# Patient Record
Sex: MALE | Employment: FULL TIME | URBAN - METROPOLITAN AREA
[De-identification: names, ages, dates, MRNs, and addresses within clinical notes are randomized per-mention and may not be internally consistent; named-entity substitution may affect disease eponyms.]

---

## 2023-02-21 PROBLEM — M25.511 ACUTE PAIN OF RIGHT SHOULDER: Status: ACTIVE | Noted: 2023-02-21

## 2023-02-21 PROBLEM — F43.0 STRESS REACTION: Status: ACTIVE | Noted: 2023-02-21

## 2023-02-21 PROBLEM — J45.909 REACTIVE AIRWAY DISEASE (HHS-HCC): Status: ACTIVE | Noted: 2023-02-21

## 2023-02-21 PROBLEM — R10.31: Status: ACTIVE | Noted: 2023-02-21

## 2023-02-21 PROBLEM — K58.9 IBS (IRRITABLE BOWEL SYNDROME): Status: ACTIVE | Noted: 2023-02-21

## 2023-02-21 PROBLEM — R73.01 ELEVATED FASTING GLUCOSE: Status: ACTIVE | Noted: 2023-02-21

## 2023-02-21 PROBLEM — R10.32: Status: ACTIVE | Noted: 2023-02-21

## 2023-02-21 PROBLEM — R30.0 DYSURIA: Status: ACTIVE | Noted: 2023-02-21

## 2023-02-21 PROBLEM — K21.9 GERD (GASTROESOPHAGEAL REFLUX DISEASE): Status: ACTIVE | Noted: 2023-02-21

## 2023-02-21 PROBLEM — R74.01 ELEVATED ALT MEASUREMENT: Status: ACTIVE | Noted: 2023-02-21

## 2023-02-21 PROBLEM — E78.00 ELEVATED LDL CHOLESTEROL LEVEL: Status: ACTIVE | Noted: 2023-02-21

## 2023-02-21 PROBLEM — F19.939: Status: ACTIVE | Noted: 2023-02-21

## 2023-02-21 PROBLEM — K76.0 FATTY LIVER: Status: ACTIVE | Noted: 2023-02-21

## 2023-02-21 PROBLEM — R32 URINARY LEAKAGE: Status: ACTIVE | Noted: 2023-02-21

## 2023-02-21 RX ORDER — SILODOSIN 4 MG/1
4 CAPSULE ORAL DAILY
COMMUNITY
End: 2024-01-09 | Stop reason: WASHOUT

## 2023-02-21 RX ORDER — SERTRALINE HYDROCHLORIDE 50 MG/1
1 TABLET, FILM COATED ORAL
COMMUNITY
End: 2023-06-16

## 2023-02-21 RX ORDER — CHLORDIAZEPOXIDE HYDROCHLORIDE AND CLIDINIUM BROMIDE 5; 2.5 MG/1; MG/1
1 CAPSULE ORAL DAILY
COMMUNITY

## 2023-02-21 RX ORDER — ALBUTEROL SULFATE 90 UG/1
2 AEROSOL, METERED RESPIRATORY (INHALATION) EVERY 4 HOURS PRN
COMMUNITY
End: 2024-01-09 | Stop reason: WASHOUT

## 2023-03-29 ENCOUNTER — APPOINTMENT (OUTPATIENT)
Dept: PRIMARY CARE | Facility: CLINIC | Age: 43
End: 2023-03-29
Payer: COMMERCIAL

## 2023-05-31 ENCOUNTER — OFFICE VISIT (OUTPATIENT)
Dept: PRIMARY CARE | Facility: CLINIC | Age: 43
End: 2023-05-31
Payer: COMMERCIAL

## 2023-05-31 VITALS
HEIGHT: 69 IN | WEIGHT: 247 LBS | DIASTOLIC BLOOD PRESSURE: 70 MMHG | SYSTOLIC BLOOD PRESSURE: 140 MMHG | BODY MASS INDEX: 36.58 KG/M2 | HEART RATE: 74 BPM | OXYGEN SATURATION: 96 %

## 2023-05-31 DIAGNOSIS — K21.9 GASTROESOPHAGEAL REFLUX DISEASE, UNSPECIFIED WHETHER ESOPHAGITIS PRESENT: ICD-10-CM

## 2023-05-31 DIAGNOSIS — K76.0 FATTY LIVER: ICD-10-CM

## 2023-05-31 DIAGNOSIS — R20.0 NUMBNESS AND TINGLING IN RIGHT HAND: Primary | ICD-10-CM

## 2023-05-31 DIAGNOSIS — R20.2 NUMBNESS AND TINGLING IN RIGHT HAND: Primary | ICD-10-CM

## 2023-05-31 DIAGNOSIS — M79.644 PAIN OF RIGHT THUMB: ICD-10-CM

## 2023-05-31 DIAGNOSIS — K58.0 IRRITABLE BOWEL SYNDROME WITH DIARRHEA: ICD-10-CM

## 2023-05-31 DIAGNOSIS — E78.00 ELEVATED LDL CHOLESTEROL LEVEL: ICD-10-CM

## 2023-05-31 DIAGNOSIS — E66.01 CLASS 2 SEVERE OBESITY DUE TO EXCESS CALORIES WITH SERIOUS COMORBIDITY AND BODY MASS INDEX (BMI) OF 36.0 TO 36.9 IN ADULT (MULTI): ICD-10-CM

## 2023-05-31 DIAGNOSIS — E11.9 TYPE 2 DIABETES MELLITUS WITHOUT COMPLICATION, WITHOUT LONG-TERM CURRENT USE OF INSULIN (MULTI): ICD-10-CM

## 2023-05-31 PROBLEM — E66.812 CLASS 2 SEVERE OBESITY DUE TO EXCESS CALORIES WITH SERIOUS COMORBIDITY AND BODY MASS INDEX (BMI) OF 36.0 TO 36.9 IN ADULT: Status: ACTIVE | Noted: 2023-05-31

## 2023-05-31 PROCEDURE — 3077F SYST BP >= 140 MM HG: CPT | Performed by: INTERNAL MEDICINE

## 2023-05-31 PROCEDURE — 1036F TOBACCO NON-USER: CPT | Performed by: INTERNAL MEDICINE

## 2023-05-31 PROCEDURE — 3078F DIAST BP <80 MM HG: CPT | Performed by: INTERNAL MEDICINE

## 2023-05-31 PROCEDURE — 99214 OFFICE O/P EST MOD 30 MIN: CPT | Performed by: INTERNAL MEDICINE

## 2023-05-31 PROCEDURE — 3008F BODY MASS INDEX DOCD: CPT | Performed by: INTERNAL MEDICINE

## 2023-05-31 RX ORDER — OMEPRAZOLE 20 MG/1
20 CAPSULE, DELAYED RELEASE ORAL DAILY
COMMUNITY

## 2023-05-31 RX ORDER — FLUTICASONE PROPIONATE 50 MCG
1 SPRAY, SUSPENSION (ML) NASAL DAILY
COMMUNITY
End: 2024-01-09 | Stop reason: WASHOUT

## 2023-05-31 ASSESSMENT — ENCOUNTER SYMPTOMS
NUMBNESS: 1
SHORTNESS OF BREATH: 0
ARTHRALGIAS: 1
FATIGUE: 0
WHEEZING: 0

## 2023-05-31 NOTE — PROGRESS NOTES
"Subjective   Patient ID: Erica Dubose is a 42 y.o. male who presents for Follow-up chronic medical problems.    SOB better with flonase spray.  2 months of right hand pain.  Numbness and electric feeling right 1-3 fingers.  Also right thumb pain.  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  Occasional joint pain, right elbow, fingers.  BS checks at 110-120.  Not watching diet.  Active outside, no formal HEP.  Weight up 20 pounds.  Meds and labs reviewed.         Review of Systems   Constitutional:  Negative for fatigue.   Respiratory:  Negative for shortness of breath and wheezing.    Cardiovascular:  Negative for chest pain.   Musculoskeletal:  Positive for arthralgias.        Hand pain   Neurological:  Positive for numbness.       Objective   /70 (BP Location: Right arm, Patient Position: Sitting)   Pulse 74   Ht 1.753 m (5' 9\")   Wt 112 kg (247 lb)   SpO2 96%   BMI 36.48 kg/m²     Physical Exam  Constitutional:       Appearance: Normal appearance.   Cardiovascular:      Rate and Rhythm: Normal rate and regular rhythm.      Pulses: Normal pulses.      Heart sounds: Normal heart sounds.   Pulmonary:      Effort: Pulmonary effort is normal.      Breath sounds: Normal breath sounds.   Abdominal:      General: Abdomen is flat.      Palpations: Abdomen is soft.   Neurological:      General: No focal deficit present.      Mental Status: He is alert.   Psychiatric:         Mood and Affect: Mood normal.         Behavior: Behavior normal.         Thought Content: Thought content normal.         Judgment: Judgment normal.         Assessment/Plan   Problem List Items Addressed This Visit          Nervous    Numbness and tingling in right hand - Primary     Schedule NCS/EMG, rec ortho consult.         Relevant Orders    XR hand right 1-2 views    EMG & nerve conduction    Referral to Orthopaedic Surgery       Digestive    Fatty liver     Check liver panel given weight gain.         GERD (gastroesophageal reflux " disease)     GERD improved with H2 blocker or PPI.          IBS (irritable bowel syndrome)     Better with fiber, librax as needed.            Musculoskeletal    Pain of right thumb     Check xrays, rec ortho consult.         Relevant Orders    XR hand right 1-2 views    Referral to Orthopaedic Surgery       Endocrine/Metabolic    Type 2 diabetes mellitus without complication, without long-term current use of insulin (CMS/[facility])     Last A1c 5.8 with diet.         Relevant Orders    Comprehensive metabolic panel    Hemoglobin A1c    Class 2 severe obesity due to excess calories with serious comorbidity and body mass index (BMI) of 36.0 to 36.9 in adult (CMS/[facility])     Recommend diet, exercise and weight management.             Other    Elevated LDL cholesterol level     Check lipid panel given weight gain.         Relevant Orders    Lipid panel

## 2023-06-02 ENCOUNTER — LAB (OUTPATIENT)
Dept: LAB | Facility: LAB | Age: 43
End: 2023-06-02
Payer: COMMERCIAL

## 2023-06-02 DIAGNOSIS — E78.00 ELEVATED LDL CHOLESTEROL LEVEL: ICD-10-CM

## 2023-06-02 DIAGNOSIS — E11.9 TYPE 2 DIABETES MELLITUS WITHOUT COMPLICATION, WITHOUT LONG-TERM CURRENT USE OF INSULIN (MULTI): ICD-10-CM

## 2023-06-02 LAB
ALANINE AMINOTRANSFERASE (SGPT) (U/L) IN SER/PLAS: 32 U/L (ref 10–52)
ALBUMIN (G/DL) IN SER/PLAS: 4.7 G/DL (ref 3.4–5)
ALKALINE PHOSPHATASE (U/L) IN SER/PLAS: 56 U/L (ref 33–120)
ANION GAP IN SER/PLAS: 15 MMOL/L (ref 10–20)
ASPARTATE AMINOTRANSFERASE (SGOT) (U/L) IN SER/PLAS: 22 U/L (ref 9–39)
BILIRUBIN TOTAL (MG/DL) IN SER/PLAS: 1.2 MG/DL (ref 0–1.2)
CALCIUM (MG/DL) IN SER/PLAS: 9.8 MG/DL (ref 8.6–10.6)
CARBON DIOXIDE, TOTAL (MMOL/L) IN SER/PLAS: 27 MMOL/L (ref 21–32)
CHLORIDE (MMOL/L) IN SER/PLAS: 104 MMOL/L (ref 98–107)
CHOLESTEROL (MG/DL) IN SER/PLAS: 194 MG/DL (ref 0–199)
CHOLESTEROL IN HDL (MG/DL) IN SER/PLAS: 42 MG/DL
CHOLESTEROL/HDL RATIO: 4.6
CREATININE (MG/DL) IN SER/PLAS: 0.92 MG/DL (ref 0.5–1.3)
ESTIMATED AVERAGE GLUCOSE FOR HBA1C: 131 MG/DL
GFR MALE: >90 ML/MIN/1.73M2
GLUCOSE (MG/DL) IN SER/PLAS: 114 MG/DL (ref 74–99)
HEMOGLOBIN A1C/HEMOGLOBIN TOTAL IN BLOOD: 6.2 %
LDL: 126 MG/DL (ref 0–99)
POTASSIUM (MMOL/L) IN SER/PLAS: 4.5 MMOL/L (ref 3.5–5.3)
PROTEIN TOTAL: 7.3 G/DL (ref 6.4–8.2)
SODIUM (MMOL/L) IN SER/PLAS: 141 MMOL/L (ref 136–145)
TRIGLYCERIDE (MG/DL) IN SER/PLAS: 132 MG/DL (ref 0–149)
UREA NITROGEN (MG/DL) IN SER/PLAS: 16 MG/DL (ref 6–23)
VLDL: 26 MG/DL (ref 0–40)

## 2023-06-02 PROCEDURE — 83036 HEMOGLOBIN GLYCOSYLATED A1C: CPT

## 2023-06-02 PROCEDURE — 36415 COLL VENOUS BLD VENIPUNCTURE: CPT

## 2023-06-02 PROCEDURE — 80061 LIPID PANEL: CPT

## 2023-06-02 PROCEDURE — 80053 COMPREHEN METABOLIC PANEL: CPT

## 2023-06-16 DIAGNOSIS — F43.0 ACUTE STRESS REACTION: ICD-10-CM

## 2023-06-16 RX ORDER — SERTRALINE HYDROCHLORIDE 50 MG/1
TABLET, FILM COATED ORAL
Qty: 90 TABLET | Refills: 1 | Status: SHIPPED | OUTPATIENT
Start: 2023-06-16 | End: 2023-12-13

## 2023-06-20 ENCOUNTER — APPOINTMENT (OUTPATIENT)
Dept: PRIMARY CARE | Facility: CLINIC | Age: 43
End: 2023-06-20
Payer: COMMERCIAL

## 2023-07-17 ENCOUNTER — OFFICE VISIT (OUTPATIENT)
Dept: PRIMARY CARE | Facility: CLINIC | Age: 43
End: 2023-07-17
Payer: COMMERCIAL

## 2023-07-17 VITALS
WEIGHT: 240 LBS | OXYGEN SATURATION: 96 % | HEART RATE: 71 BPM | BODY MASS INDEX: 35.55 KG/M2 | SYSTOLIC BLOOD PRESSURE: 130 MMHG | HEIGHT: 69 IN | DIASTOLIC BLOOD PRESSURE: 74 MMHG

## 2023-07-17 DIAGNOSIS — E66.01 CLASS 2 SEVERE OBESITY DUE TO EXCESS CALORIES WITH SERIOUS COMORBIDITY AND BODY MASS INDEX (BMI) OF 35.0 TO 35.9 IN ADULT (MULTI): ICD-10-CM

## 2023-07-17 DIAGNOSIS — F15.93: ICD-10-CM

## 2023-07-17 DIAGNOSIS — E11.9 TYPE 2 DIABETES MELLITUS WITHOUT COMPLICATION, WITHOUT LONG-TERM CURRENT USE OF INSULIN (MULTI): ICD-10-CM

## 2023-07-17 DIAGNOSIS — E78.00 ELEVATED LDL CHOLESTEROL LEVEL: ICD-10-CM

## 2023-07-17 DIAGNOSIS — M25.521 RIGHT ELBOW PAIN: ICD-10-CM

## 2023-07-17 DIAGNOSIS — Z00.00 ENCOUNTER FOR PREVENTATIVE ADULT HEALTH CARE EXAMINATION: Primary | ICD-10-CM

## 2023-07-17 PROBLEM — M25.511 ACUTE PAIN OF RIGHT SHOULDER: Status: RESOLVED | Noted: 2023-02-21 | Resolved: 2023-07-17

## 2023-07-17 PROCEDURE — 99396 PREV VISIT EST AGE 40-64: CPT | Performed by: INTERNAL MEDICINE

## 2023-07-17 PROCEDURE — 3075F SYST BP GE 130 - 139MM HG: CPT | Performed by: INTERNAL MEDICINE

## 2023-07-17 PROCEDURE — 3044F HG A1C LEVEL LT 7.0%: CPT | Performed by: INTERNAL MEDICINE

## 2023-07-17 PROCEDURE — 3078F DIAST BP <80 MM HG: CPT | Performed by: INTERNAL MEDICINE

## 2023-07-17 PROCEDURE — 3008F BODY MASS INDEX DOCD: CPT | Performed by: INTERNAL MEDICINE

## 2023-07-17 PROCEDURE — 1036F TOBACCO NON-USER: CPT | Performed by: INTERNAL MEDICINE

## 2023-07-17 ASSESSMENT — ENCOUNTER SYMPTOMS
CONSTITUTIONAL NEGATIVE: 1
HEMATOLOGIC/LYMPHATIC NEGATIVE: 1
ALLERGIC/IMMUNOLOGIC NEGATIVE: 1
ENDOCRINE NEGATIVE: 1
CARDIOVASCULAR NEGATIVE: 1
RESPIRATORY NEGATIVE: 1
GASTROINTESTINAL NEGATIVE: 1
NEUROLOGICAL NEGATIVE: 1
PSYCHIATRIC NEGATIVE: 1
EYES NEGATIVE: 1

## 2023-07-17 NOTE — PROGRESS NOTES
"Subjective   Patient ID: Erica Dubose is a 43 y.o. male who presents for Annual Exam and Follow-up (Right elbow pain now, unable to lift.  Concerned on ligament issues. Recently DX CTS right/).    Had fu with ortho.  Cortisone injection right wrist.  Mild carpal tunnel right wrist, EMG findings.  Always has sensation of pain in right wrist.  Pain in thumb, works with mouse 8 hours daily.  Right elbow pain, cannot lift 4 pounds.  Accidentally hit father's head with right elbow.  Injury occurred months ago.    Diet, trying to follow healthy, rice and vegetables.  HEP, 8000 steps daily.  Weight down about 7 pounds past year.  Meds and labs reviewed.     Review of Systems   Constitutional: Negative.    HENT: Negative.     Eyes: Negative.    Respiratory: Negative.     Cardiovascular: Negative.    Gastrointestinal: Negative.    Endocrine: Negative.    Genitourinary: Negative.    Musculoskeletal:         Right elbow and hand pain.   Skin: Negative.    Allergic/Immunologic: Negative.    Neurological: Negative.    Hematological: Negative.    Psychiatric/Behavioral: Negative.         Objective   /74 (BP Location: Right arm, Patient Position: Sitting)   Pulse 71   Ht 1.753 m (5' 9\")   Wt 109 kg (240 lb)   SpO2 96%   BMI 35.44 kg/m²     Physical Exam  Vitals and nursing note reviewed.   Constitutional:       Appearance: Normal appearance.   HENT:      Head: Normocephalic and atraumatic.      Right Ear: Tympanic membrane normal.      Left Ear: Tympanic membrane normal.      Nose: Nose normal.      Mouth/Throat:      Pharynx: Oropharynx is clear.   Eyes:      Extraocular Movements: Extraocular movements intact.      Conjunctiva/sclera: Conjunctivae normal.      Pupils: Pupils are equal, round, and reactive to light.   Cardiovascular:      Rate and Rhythm: Normal rate and regular rhythm.      Pulses: Normal pulses.      Heart sounds: Normal heart sounds.   Pulmonary:      Effort: Pulmonary effort is normal.      " Breath sounds: Normal breath sounds.   Abdominal:      General: Bowel sounds are normal.      Palpations: Abdomen is soft.   Musculoskeletal:         General: Normal range of motion.      Cervical back: Normal range of motion and neck supple.   Skin:     General: Skin is warm and dry.   Neurological:      General: No focal deficit present.      Mental Status: He is alert and oriented to person, place, and time. Mental status is at baseline.   Psychiatric:         Mood and Affect: Mood normal.         Behavior: Behavior normal.         Thought Content: Thought content normal.         Judgment: Judgment normal.       Assessment/Plan     Adult Health Maintenance  A1c 6.2 with diet.  Recommend diet, exercise and weight management.  Consider meds if A1c not improved.  Rec xrays for right elbow pain.  Has fu with ortho.  Recheck labs with next ov in 6 months.    Problem List Items Addressed This Visit          Cardiac and Vasculature    Elevated LDL cholesterol level    Relevant Orders    Lipid Panel       Endocrine/Metabolic    Type 2 diabetes mellitus without complication, without long-term current use of insulin (CMS/[facility])     A1c 6.2 with diet.         Relevant Orders    Hemoglobin A1C    Basic metabolic panel    Class 2 severe obesity due to excess calories with serious comorbidity and body mass index (BMI) of 35.0 to 35.9 in adult (CMS/HCC)     Recommend diet, exercise and weight management.              Health Encounters    Encounter for preventative adult health care examination - Primary       Mental Health    Withdrawal syndrome (CMS/HCC)     Resolved, no longer vaping.            Musculoskeletal and Injuries    Right elbow pain    Relevant Orders    XR elbow right 1-2 views

## 2023-09-29 LAB
ALANINE AMINOTRANSFERASE (SGPT) (U/L) IN SER/PLAS: 36 U/L (ref 10–52)
ALBUMIN (G/DL) IN SER/PLAS: 5.1 G/DL (ref 3.4–5)
ALKALINE PHOSPHATASE (U/L) IN SER/PLAS: 65 U/L (ref 33–120)
ASPARTATE AMINOTRANSFERASE (SGOT) (U/L) IN SER/PLAS: 22 U/L (ref 9–39)
BILIRUBIN DIRECT (MG/DL) IN SER/PLAS: 0.1 MG/DL (ref 0–0.3)
BILIRUBIN TOTAL (MG/DL) IN SER/PLAS: 0.9 MG/DL (ref 0–1.2)
PROTEIN TOTAL: 7.6 G/DL (ref 6.4–8.2)
TISSUE TRANSGLUTAMINASE IGG: <1 U/ML (ref 0–14)
TISSUE TRANSGLUTAMINASE, IGA: <1 U/ML (ref 0–14)

## 2023-09-30 LAB
ALLERGEN FOOD: CLAM (RUDITAPES SPP.) IGE (KU/L): <0.1 KU/L
ALLERGEN FOOD: EGG WHITE IGE (KU/L): <0.1 KU/L
ALLERGEN FOOD: FISH (COD) GADUS MORHUA) IGE (KU/L): <0.1 KU/L
ALLERGEN FOOD: MAIZE, CORN (ZEA MAYS) IGE (KU/L): 0.13 KU/L
ALLERGEN FOOD: MILK IGE (KU/L): 0.13 KU/L
ALLERGEN FOOD: PEANUT (ARACHIS HYPOGAEA) IGE (KU/L): <0.1 KU/L
ALLERGEN FOOD: SCALLOP (PECTEN SPP.) IGE (KU/L): <0.1 KU/L
ALLERGEN FOOD: SESAME SEED (SESAMUM INDICUM) IGE (KU/L): 0.1 KU/L
ALLERGEN FOOD: SHRIMP (P. BOREALIS/MONODON, M. BARBATA/JOYNERI) IGE (KU/L): <0.1 KU/L
ALLERGEN FOOD: SOYBEAN (GLYCINE MAX) IGE (KU/L): <0.1 KU/L
ALLERGEN FOOD: WALNUT (JUGLANS SPP.) IGE (KU/L): <0.1 KU/L
ALLERGEN FOOD: WHEAT (TRITICUM AESTIVUM) IGE (KU/L): <0.1 KU/L
IMMUNOCAP INTERPRETATION: NORMAL

## 2023-10-02 ENCOUNTER — TREATMENT (OUTPATIENT)
Dept: PHYSICAL THERAPY | Facility: CLINIC | Age: 43
End: 2023-10-02
Payer: COMMERCIAL

## 2023-10-02 DIAGNOSIS — M25.521 RIGHT ELBOW PAIN: Primary | ICD-10-CM

## 2023-10-02 PROCEDURE — 97140 MANUAL THERAPY 1/> REGIONS: CPT | Mod: CQ,GP

## 2023-10-02 PROCEDURE — 97110 THERAPEUTIC EXERCISES: CPT | Mod: CQ,GP

## 2023-10-02 NOTE — PROGRESS NOTES
Physical Therapy    Physical Therapy Treatment    Patient Name: Erica Dubose  MRN: [id number]  Today's Date: 10/2/2023         Assessment:   Session focused on pain relief this session d/t increased pain overall. TTP along wrist extensors, niecy proximally but voiced relief during and after. Less pain with wrist AROM following.        Plan: Cont to progress strengthening, manual tx as tolerated        Current Problem  1. Right elbow pain            Subjective   Pt reports increased pain in elbow pain overall. Pain with HEP.      Precautions  None      Pain   2/10 R elbow   Pain reaches to 8/10 with therex/HEP    Objective   No measures today       Treatments:   UBE L3 x 5 min light    R wrist flex/ext stretch x 30sec       Manual tx:   STM to R wrist extensors  R lat epicondyle, tricep

## 2023-10-05 ENCOUNTER — LAB (OUTPATIENT)
Dept: LAB | Facility: LAB | Age: 43
End: 2023-10-05
Payer: COMMERCIAL

## 2023-10-05 DIAGNOSIS — R19.7 DIARRHEA, UNSPECIFIED: Primary | ICD-10-CM

## 2023-10-06 ENCOUNTER — LAB (OUTPATIENT)
Dept: LAB | Facility: LAB | Age: 43
End: 2023-10-06
Payer: COMMERCIAL

## 2023-10-06 DIAGNOSIS — R19.7 DIARRHEA, UNSPECIFIED: ICD-10-CM

## 2023-10-06 PROCEDURE — 82653 EL-1 FECAL QUANTITATIVE: CPT

## 2023-10-06 PROCEDURE — 83993 ASSAY FOR CALPROTECTIN FECAL: CPT

## 2023-10-06 PROCEDURE — 36415 COLL VENOUS BLD VENIPUNCTURE: CPT

## 2023-10-09 ENCOUNTER — TREATMENT (OUTPATIENT)
Dept: PHYSICAL THERAPY | Facility: CLINIC | Age: 43
End: 2023-10-09
Payer: COMMERCIAL

## 2023-10-09 DIAGNOSIS — M25.521 RIGHT ELBOW PAIN: Primary | ICD-10-CM

## 2023-10-09 PROCEDURE — 97140 MANUAL THERAPY 1/> REGIONS: CPT | Mod: GP | Performed by: PHYSICAL THERAPIST

## 2023-10-09 PROCEDURE — 97110 THERAPEUTIC EXERCISES: CPT | Mod: GP | Performed by: PHYSICAL THERAPIST

## 2023-10-09 ASSESSMENT — PAIN SCALES - GENERAL: PAINLEVEL_OUTOF10: 0 - NO PAIN

## 2023-10-09 ASSESSMENT — PAIN - FUNCTIONAL ASSESSMENT: PAIN_FUNCTIONAL_ASSESSMENT: 0-10

## 2023-10-09 NOTE — PROGRESS NOTES
Physical Therapy Treatment    Patient Name: Erica Dubose  MRN: [id number]  Today's Date: 10/9/2023  Time Calculation  Start Time: 1700  Stop Time: 1800  Time Calculation (min): 60 min    Current Problem:  Problem List Items Addressed This Visit             ICD-10-CM       Musculoskeletal and Injuries    Right elbow pain - Primary M25.521       Subjective   General:   Pt was unable to move his elbow after treatment last session.   He reports that manual therapy helped temporarily but did not provide long term relief. The pain started to distribute along the posterior and anterior aspects of the elbow.  At this point his pain has returned back to where it was starting last week's session.     Precautions:  Precautions  Precautions Comment: None    Pain:  Pain Assessment: 0-10  Pain Score: 0 - No pain at rest. Pain 9/10 when performing provoking movements.     Objective   Treatment:  Therapeutic exercise  UBE L3 x5 min light   R wrist flex/ext stretch x30 sec  Tricep pulldowns YTB x20  Lat pulldowns YTB x20  Rows OTB x20  ER pullouts YTB x20    Manual therapy  STM/IASTM and TPR to R wrist extensors, R lat epicondyle, tricep    Modalities  CP to R lateral forearm x10 min    Assessment   Pt responded well to treatment today with challenge.   Tolerated scapular stabilization exercises with minimal pain provocation. This allowed pt to bend elbow into 90 deg flexion without discomfort.   Pt reported much pain relief after manual therapy and trigger point release to R distal triceps insertion.   Skin intact after application of CP to R forearm.     Plan    Continue to progress POC as tolerated by patient to improve strength and mobility.      Goals:

## 2023-10-11 LAB
CALPROTECTIN STL-MCNT: 61 UG/G
ELASTASE PANC STL-MCNT: 568 UG/G

## 2023-10-16 ENCOUNTER — TREATMENT (OUTPATIENT)
Dept: PHYSICAL THERAPY | Facility: CLINIC | Age: 43
End: 2023-10-16
Payer: COMMERCIAL

## 2023-10-16 DIAGNOSIS — M25.521 RIGHT ELBOW PAIN: Primary | ICD-10-CM

## 2023-10-16 DIAGNOSIS — M25.521 PAIN IN RIGHT ELBOW: ICD-10-CM

## 2023-10-16 PROCEDURE — 97140 MANUAL THERAPY 1/> REGIONS: CPT | Mod: GP | Performed by: PHYSICAL THERAPIST

## 2023-10-16 PROCEDURE — 97035 APP MDLTY 1+ULTRASOUND EA 15: CPT | Mod: GP | Performed by: PHYSICAL THERAPIST

## 2023-10-16 ASSESSMENT — PAIN SCALES - GENERAL
PAINLEVEL_OUTOF10: 0 - NO PAIN
PAINLEVEL_OUTOF10: 0 - NO PAIN

## 2023-10-16 NOTE — PROGRESS NOTES
Physical Therapy Treatment    Patient Name: Erica Dubose  MRN: [id number]  Today's Date: 10/16/2023  Time Calculation  Start Time: 1703  Stop Time: 1745  Time Calculation (min): 42 min    Current Problem:  Problem List Items Addressed This Visit             ICD-10-CM    Right elbow pain - Primary M25.521     Other Visit Diagnoses         Codes    Pain in right elbow     M25.521              Subjective   General:   Pt continues to have pain with lifting and other daily activities. 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 Continuous or repeative activity his pain is flared. His pain begins about 15 min into driving. He was unable to lift 5 gallon gas can with R UE due to pain.     Precautions:  Precautions  Precautions Comment: None    Pain:  0/10    Objective   Treatment:  Therapeutic exercise  UBE L3 x5 min light   R wrist flex/ext stretch x30 sec  Tricep pulldowns YTB x20  Lat pulldowns YTB x20  Rows OTB x20  ER pullouts YTB x20    Manual therapy  STM/IASTM and TPR to R wrist extensors, R lat epicondyle, tricep    Modalities  US 3 Mhz, 1.0 W/cm2 x 10 min R elbow (lat epicondyle, tricep)     Assessment  Pt continues to have signifcant pain limiting his daily function. He initially tolerated US well however experienced significant pain at one point that did subside. He tolerated manual tx well. His symptoms appear to be in the tricep tendon area at this point. He did have a lot of TTP when palpating and performing cross friction massage over this area.     Plan    Pt instructed to follow up with me via email or phone to update me on his status over the next few days. If still experiencing the same pain I will recommend return to MD      Goals:

## 2023-10-25 NOTE — PROGRESS NOTES
Physical Therapy Treatment    Patient Name: Erica Dubose  MRN: [id number]  Today's Date: 10/26/2023       Current Problem:  Problem List Items Addressed This Visit    None          Subjective   General:   Pt reports first day after his last session he had pain but after that the pain reduced about 20%. This past weekend he experienced some pain but back in lateral elbow region.      Precautions:  Precautions  Precautions Comment: none    Pain:  0/10    Objective   Treatment:  Therapeutic exercise  UBE L3 x5 min light   R wrist flex/ext stretch x30 sec    Verbal and written education provided to patient this date regarding TrP dry needling and associated risks and benefits. Patient verbalizes understanding of associated risks and benefits with TrP dry needling, provides verbal consent to proceed, and denies questions at this time. Dry needling utilized this date to address ongoing pain and dysfunction in R elbow. Soft tissue assessment completed via manual palpation with active TrPs, muscular hypertonicity, and pain noted throughout.     Held:  Tricep pulldowns YTB x20  Lat pulldowns YTB x20  Rows OTB x20  ER pullouts YTB x20    Manual therapy  STM/IASTM and TPR to R wrist extensors, R lat epicondyle, tricep  Treatment: 30 mm needle inserted into R elbow ECRB, ECRL, ED, ECU and performed with pistoning.    Needle site clear and without adverse reaction immediately post needle removal. Patient advised to call PT if excessive soreness, bruising, or adverse event is noted post needling. Patient verbalizes understanding and denies questions at this time.      Modalities  US 3 Mhz, 1.2 W/cm2 x 10 min R elbow (lat epicondyle, tricep)     Assessment  Pt tolerated manual and US tx well. His tenderness was more so over the extensors today rather than the tricep region. He did not have any LTR with TDN but tolerated it well nonetheless. Pt scheduled for one more session. Will continue with DN if he has a good  response.    Plan    Continue to progress ROM and strength as tolerated.     Goals:

## 2023-10-26 ENCOUNTER — TREATMENT (OUTPATIENT)
Dept: PHYSICAL THERAPY | Facility: CLINIC | Age: 43
End: 2023-10-26
Payer: COMMERCIAL

## 2023-10-26 DIAGNOSIS — M25.521 RIGHT ELBOW PAIN: ICD-10-CM

## 2023-10-26 PROCEDURE — 97035 APP MDLTY 1+ULTRASOUND EA 15: CPT | Mod: GP | Performed by: PHYSICAL THERAPIST

## 2023-10-26 PROCEDURE — 97140 MANUAL THERAPY 1/> REGIONS: CPT | Mod: GP | Performed by: PHYSICAL THERAPIST

## 2023-10-26 ASSESSMENT — PAIN SCALES - GENERAL: PAINLEVEL_OUTOF10: 0 - NO PAIN

## 2023-10-30 ENCOUNTER — TREATMENT (OUTPATIENT)
Dept: PHYSICAL THERAPY | Facility: CLINIC | Age: 43
End: 2023-10-30
Payer: COMMERCIAL

## 2023-10-30 DIAGNOSIS — M25.521 RIGHT ELBOW PAIN: ICD-10-CM

## 2023-10-30 PROCEDURE — 97140 MANUAL THERAPY 1/> REGIONS: CPT | Mod: GP,CQ

## 2023-10-30 PROCEDURE — 97110 THERAPEUTIC EXERCISES: CPT | Mod: GP,CQ

## 2023-10-30 ASSESSMENT — PAIN - FUNCTIONAL ASSESSMENT
PAIN_FUNCTIONAL_ASSESSMENT: 0-10
PAIN_FUNCTIONAL_ASSESSMENT: 0-10

## 2023-10-30 ASSESSMENT — PAIN SCALES - GENERAL
PAINLEVEL_OUTOF10: 2
PAINLEVEL_OUTOF10: 2

## 2023-10-30 NOTE — PROGRESS NOTES
Physical Therapy Treatment    Patient Name: Erica Dubose  MRN: [id number]  Today's Date: 10/30/2023  Time Calculation  Start Time: 1700  Stop Time: 1745  Time Calculation (min): 45 min    Current Problem:  Problem List Items Addressed This Visit             ICD-10-CM    Right elbow pain M25.521           Subjective   General:   Pt reports pain levels and location varies depending on position and movement. Feels like US has helped some the last few sessions.    Still pain with lifting approx 5 # or when arm is extended straight.   Follow up with Dr. Drake Renee 11/2.   Precautions:  Precautions  Precautions Comment: None    Pain:  0/10    Objective   Treatment:  Therapeutic exercise  UBE L3 x5 min light   R wrist flex/ext stretch x30 sec  Tricep pulldowns OTB x20  Lat pulldowns YTB x20  Rows GTB x20  ER pullouts YTB x2    Manual therapy  K TAPE to R lat epicondyle   Educated pt on adverse reactions and instructed to doff immediately if he experiences any     Modalities  US 3 Mhz, 1.2 W/cm 20% x 2 min R elbow (lat epicondyle, tricep)     Assessment  Pt overall does not seem to be progressing with PT. Pt has continued pain and functional limitations.   Voiced uncomfortable, radiating pain with ultrasound so discontinued immediately. Trialed Ktape to lateral epicondyle. Pt voiced feeling more stable following. Discussed adverse reactions. Pt able to tolerate the rest of therex well with some progressions in tband resistance.     Plan    Pt to follow up with ortho 11/2    Goals:

## 2023-11-01 ENCOUNTER — ANCILLARY PROCEDURE (OUTPATIENT)
Dept: RADIOLOGY | Facility: CLINIC | Age: 43
End: 2023-11-01
Payer: COMMERCIAL

## 2023-11-01 DIAGNOSIS — M25.561 PAIN IN RIGHT KNEE: ICD-10-CM

## 2023-11-01 PROCEDURE — 73562 X-RAY EXAM OF KNEE 3: CPT | Mod: RT,FY

## 2023-11-01 PROCEDURE — 73562 X-RAY EXAM OF KNEE 3: CPT | Mod: RIGHT SIDE | Performed by: STUDENT IN AN ORGANIZED HEALTH CARE EDUCATION/TRAINING PROGRAM

## 2023-11-29 ENCOUNTER — LAB REQUISITION (OUTPATIENT)
Dept: LAB | Facility: HOSPITAL | Age: 43
End: 2023-11-29
Payer: COMMERCIAL

## 2023-11-29 DIAGNOSIS — R19.5 OTHER FECAL ABNORMALITIES: ICD-10-CM

## 2023-11-29 PROCEDURE — 0753T DGTZ GLS MCRSCP SLD LEVEL IV: CPT

## 2023-11-29 PROCEDURE — 88305 TISSUE EXAM BY PATHOLOGIST: CPT

## 2023-11-29 PROCEDURE — 88305 TISSUE EXAM BY PATHOLOGIST: CPT | Performed by: PATHOLOGY

## 2023-12-06 NOTE — PROGRESS NOTES
Physical Therapy    Discharge Summary    Name: Erica Dubose  MRN: [id number]  : 1980  Date: 23    Discharge Summary: PT    Discharge Information: Date of discharge 23, Date of last visit 10/30/23, Date of evaluation 23, Number of attended visits 7, Referred by Dr. Juan, and Referred for R elbow     Therapy Summary: Pt was not making any significant progress at the time of his last session       Rehab Discharge Reason: Other Progress plateaued

## 2023-12-12 LAB
LABORATORY COMMENT REPORT: NORMAL
PATH REPORT.FINAL DX SPEC: NORMAL
PATH REPORT.GROSS SPEC: NORMAL
PATH REPORT.RELEVANT HX SPEC: NORMAL
PATH REPORT.TOTAL CANCER: NORMAL

## 2023-12-13 DIAGNOSIS — F43.0 ACUTE STRESS REACTION: ICD-10-CM

## 2023-12-13 RX ORDER — SERTRALINE HYDROCHLORIDE 50 MG/1
TABLET, FILM COATED ORAL
Qty: 90 TABLET | Refills: 1 | Status: SHIPPED | OUTPATIENT
Start: 2023-12-13

## 2023-12-29 ENCOUNTER — LAB (OUTPATIENT)
Dept: LAB | Facility: LAB | Age: 43
End: 2023-12-29
Payer: COMMERCIAL

## 2023-12-29 DIAGNOSIS — E11.9 TYPE 2 DIABETES MELLITUS WITHOUT COMPLICATION, WITHOUT LONG-TERM CURRENT USE OF INSULIN (MULTI): ICD-10-CM

## 2023-12-29 DIAGNOSIS — E78.00 ELEVATED LDL CHOLESTEROL LEVEL: ICD-10-CM

## 2023-12-29 LAB
ANION GAP SERPL CALC-SCNC: 13 MMOL/L (ref 10–20)
BUN SERPL-MCNC: 21 MG/DL (ref 6–23)
CALCIUM SERPL-MCNC: 10.7 MG/DL (ref 8.6–10.6)
CHLORIDE SERPL-SCNC: 102 MMOL/L (ref 98–107)
CHOLEST SERPL-MCNC: 171 MG/DL (ref 0–199)
CHOLESTEROL/HDL RATIO: 5
CO2 SERPL-SCNC: 30 MMOL/L (ref 21–32)
CREAT SERPL-MCNC: 0.96 MG/DL (ref 0.5–1.3)
EST. AVERAGE GLUCOSE BLD GHB EST-MCNC: 131 MG/DL
GFR SERPL CREATININE-BSD FRML MDRD: >90 ML/MIN/1.73M*2
GLUCOSE SERPL-MCNC: 116 MG/DL (ref 74–99)
HBA1C MFR BLD: 6.2 %
HDLC SERPL-MCNC: 34.4 MG/DL
LDLC SERPL CALC-MCNC: 115 MG/DL
NON HDL CHOLESTEROL: 137 MG/DL (ref 0–149)
POTASSIUM SERPL-SCNC: 4.9 MMOL/L (ref 3.5–5.3)
SODIUM SERPL-SCNC: 140 MMOL/L (ref 136–145)
TRIGL SERPL-MCNC: 110 MG/DL (ref 0–149)
VLDL: 22 MG/DL (ref 0–40)

## 2023-12-29 PROCEDURE — 80061 LIPID PANEL: CPT

## 2023-12-29 PROCEDURE — 83036 HEMOGLOBIN GLYCOSYLATED A1C: CPT

## 2023-12-29 PROCEDURE — 36415 COLL VENOUS BLD VENIPUNCTURE: CPT

## 2023-12-29 PROCEDURE — 80048 BASIC METABOLIC PNL TOTAL CA: CPT

## 2024-01-09 ENCOUNTER — OFFICE VISIT (OUTPATIENT)
Dept: PRIMARY CARE | Facility: CLINIC | Age: 44
End: 2024-01-09
Payer: COMMERCIAL

## 2024-01-09 VITALS
SYSTOLIC BLOOD PRESSURE: 124 MMHG | HEIGHT: 69 IN | BODY MASS INDEX: 34.51 KG/M2 | HEART RATE: 60 BPM | WEIGHT: 233 LBS | OXYGEN SATURATION: 96 % | DIASTOLIC BLOOD PRESSURE: 76 MMHG

## 2024-01-09 DIAGNOSIS — F43.0 STRESS REACTION: ICD-10-CM

## 2024-01-09 DIAGNOSIS — E66.09 CLASS 1 OBESITY DUE TO EXCESS CALORIES WITH SERIOUS COMORBIDITY AND BODY MASS INDEX (BMI) OF 34.0 TO 34.9 IN ADULT: ICD-10-CM

## 2024-01-09 DIAGNOSIS — E11.9 TYPE 2 DIABETES MELLITUS WITHOUT COMPLICATION, WITHOUT LONG-TERM CURRENT USE OF INSULIN (MULTI): Primary | ICD-10-CM

## 2024-01-09 DIAGNOSIS — K76.0 FATTY LIVER: ICD-10-CM

## 2024-01-09 DIAGNOSIS — K21.9 GASTROESOPHAGEAL REFLUX DISEASE, UNSPECIFIED WHETHER ESOPHAGITIS PRESENT: ICD-10-CM

## 2024-01-09 PROBLEM — J45.909 REACTIVE AIRWAY DISEASE (HHS-HCC): Status: RESOLVED | Noted: 2023-02-21 | Resolved: 2024-01-09

## 2024-01-09 PROBLEM — R30.0 DYSURIA: Status: RESOLVED | Noted: 2023-02-21 | Resolved: 2024-01-09

## 2024-01-09 PROBLEM — E66.811 CLASS 1 OBESITY DUE TO EXCESS CALORIES WITH SERIOUS COMORBIDITY AND BODY MASS INDEX (BMI) OF 34.0 TO 34.9 IN ADULT: Status: ACTIVE | Noted: 2023-05-31

## 2024-01-09 PROBLEM — R32 URINARY LEAKAGE: Status: RESOLVED | Noted: 2023-02-21 | Resolved: 2024-01-09

## 2024-01-09 PROBLEM — F19.939: Status: RESOLVED | Noted: 2023-02-21 | Resolved: 2024-01-09

## 2024-01-09 PROCEDURE — 1036F TOBACCO NON-USER: CPT | Performed by: INTERNAL MEDICINE

## 2024-01-09 PROCEDURE — 99214 OFFICE O/P EST MOD 30 MIN: CPT | Performed by: INTERNAL MEDICINE

## 2024-01-09 PROCEDURE — 3074F SYST BP LT 130 MM HG: CPT | Performed by: INTERNAL MEDICINE

## 2024-01-09 PROCEDURE — 3078F DIAST BP <80 MM HG: CPT | Performed by: INTERNAL MEDICINE

## 2024-01-09 PROCEDURE — 3008F BODY MASS INDEX DOCD: CPT | Performed by: INTERNAL MEDICINE

## 2024-01-09 ASSESSMENT — ENCOUNTER SYMPTOMS
DIZZINESS: 0
CHOKING: 0
FATIGUE: 0
ABDOMINAL PAIN: 0
SHORTNESS OF BREATH: 0
HEARTBURN: 0
DIABETIC ASSOCIATED SYMPTOMS: 0

## 2024-01-09 NOTE — PROGRESS NOTES
"Subjective   Patient ID: Erica Dubose is a 43 y.o. male who presents for Follow-up chronic medical problems.    MRI showed partial tear in right elbow area per pt.  MRI report not in emr.  Conservative treatment.  Pain reduced by 50% now.  Pt recalls injury back in 3-2023, pulling cord on chainsaw.  GI following fatty liver.  Meds and labs reviewed.      Diabetes  He presents for his follow-up diabetic visit. He has type 2 diabetes mellitus. His disease course has been stable. Pertinent negatives for hypoglycemia include no dizziness. There are no diabetic associated symptoms. Pertinent negatives for diabetes include no chest pain and no fatigue. There are no diabetic complications. His weight is decreasing steadily. He is following a generally healthy diet. His overall blood glucose range is 110-130 mg/dl.   GERD  He reports no abdominal pain, no chest pain, no choking, no dysphagia or no heartburn. This is a chronic problem. The current episode started more than 1 year ago. The problem occurs occasionally. The problem has been resolved. Pertinent negatives include no fatigue.        Review of Systems   Constitutional:  Negative for fatigue.   Respiratory:  Negative for choking and shortness of breath.    Cardiovascular:  Negative for chest pain.   Gastrointestinal:  Negative for abdominal pain, dysphagia and heartburn.   Musculoskeletal:         Right elbow pain.   Neurological:  Negative for dizziness.       Objective   /76 (BP Location: Right arm, Patient Position: Sitting)   Pulse 60   Ht 1.753 m (5' 9\")   Wt 106 kg (233 lb)   SpO2 96%   BMI 34.41 kg/m²     Physical Exam  Constitutional:       Appearance: Normal appearance. He is obese.   Cardiovascular:      Rate and Rhythm: Normal rate and regular rhythm.      Pulses: Normal pulses.      Heart sounds: Normal heart sounds.   Pulmonary:      Effort: Pulmonary effort is normal.      Breath sounds: Normal breath sounds.   Abdominal:      " General: Abdomen is flat. Bowel sounds are normal.      Palpations: Abdomen is soft.      Tenderness: There is no abdominal tenderness. There is no guarding or rebound.   Neurological:      General: No focal deficit present.      Mental Status: He is alert.   Psychiatric:         Mood and Affect: Mood normal.         Behavior: Behavior normal.         Thought Content: Thought content normal.         Judgment: Judgment normal.       Assessment/Plan   Problem List Items Addressed This Visit             ICD-10-CM    Fatty liver K76.0     Recent LFTs 9-2023 normal.  GI following.         Relevant Orders    Comprehensive metabolic panel    GERD (gastroesophageal reflux disease) K21.9     GERD improved with H2 blocker or PPI.           Stress reaction F43.0     Mood improved with SSRI.         Type 2 diabetes mellitus without complication, without long-term current use of insulin (CMS/[facility]) - Primary E11.9     A1c 6.2 with diet.  Side effects with metformin.         Relevant Orders    Hemoglobin A1C    Comprehensive metabolic panel    Class 1 obesity due to excess calories with serious comorbidity and body mass index (BMI) of 34.0 to 34.9 in adult E66.09, Z68.34     Recommend diet, exercise and weight management.

## 2024-02-22 ENCOUNTER — OFFICE VISIT (OUTPATIENT)
Dept: PRIMARY CARE | Facility: CLINIC | Age: 44
End: 2024-02-22
Payer: COMMERCIAL

## 2024-02-22 ENCOUNTER — LAB (OUTPATIENT)
Dept: LAB | Facility: LAB | Age: 44
End: 2024-02-22
Payer: COMMERCIAL

## 2024-02-22 VITALS
HEART RATE: 81 BPM | HEIGHT: 69 IN | DIASTOLIC BLOOD PRESSURE: 78 MMHG | BODY MASS INDEX: 33.86 KG/M2 | OXYGEN SATURATION: 95 % | SYSTOLIC BLOOD PRESSURE: 122 MMHG | WEIGHT: 228.6 LBS

## 2024-02-22 DIAGNOSIS — M54.50 ACUTE BILATERAL LOW BACK PAIN WITHOUT SCIATICA: ICD-10-CM

## 2024-02-22 DIAGNOSIS — R35.0 FREQUENT URINATION: Primary | ICD-10-CM

## 2024-02-22 DIAGNOSIS — R35.0 FREQUENT URINATION: ICD-10-CM

## 2024-02-22 PROCEDURE — 36415 COLL VENOUS BLD VENIPUNCTURE: CPT

## 2024-02-22 PROCEDURE — 85025 COMPLETE CBC W/AUTO DIFF WBC: CPT

## 2024-02-22 PROCEDURE — 80053 COMPREHEN METABOLIC PANEL: CPT

## 2024-02-22 PROCEDURE — 81003 URINALYSIS AUTO W/O SCOPE: CPT

## 2024-02-22 PROCEDURE — 99213 OFFICE O/P EST LOW 20 MIN: CPT | Performed by: NURSE PRACTITIONER

## 2024-02-22 PROCEDURE — 3078F DIAST BP <80 MM HG: CPT | Performed by: NURSE PRACTITIONER

## 2024-02-22 PROCEDURE — 3008F BODY MASS INDEX DOCD: CPT | Performed by: NURSE PRACTITIONER

## 2024-02-22 PROCEDURE — 1036F TOBACCO NON-USER: CPT | Performed by: NURSE PRACTITIONER

## 2024-02-22 PROCEDURE — 3074F SYST BP LT 130 MM HG: CPT | Performed by: NURSE PRACTITIONER

## 2024-02-22 ASSESSMENT — ENCOUNTER SYMPTOMS
DYSURIA: 0
RESPIRATORY NEGATIVE: 1
CONSTITUTIONAL NEGATIVE: 1
NEUROLOGICAL NEGATIVE: 1
GASTROINTESTINAL NEGATIVE: 1
CARDIOVASCULAR NEGATIVE: 1
BACK PAIN: 1
DIFFICULTY URINATING: 0
FLANK PAIN: 1

## 2024-02-22 NOTE — PATIENT INSTRUCTIONS
Urine, US kidnesy and lab orders in computer.   Continue to drink water- 64 ounces  Ibuprofen as directed

## 2024-02-22 NOTE — PROGRESS NOTES
"Subjective   Patient ID: Erica Dubose is a 43 y.o. male who presents for Urinary Frequency (Lov 1/9/24/Nov 5/2/24/Per pt it has gotten to the point where he's going to the bathroom now 4-5 times a night. Started around the same time as his back pain. This has never occurred before per pt. No burning, no abd discomfort before/after urination. ) and Back Pain (Per pt about 20 days - 3 weeks he's been having the pain back but he also has a disk problem. He's never had pain on both sides before. It's not severe pain but it's enough to bother him and it stays for a while. He's never seen a specialist for this. No chiropractor, no orthopedic dr. No former injury occurred to cause pain. ).    HPI   Patient of Dr Her here for ongoing frequent urination approximately 3 weeks. Last seen on 01/09/2023  Current concern:  1) back pain- bilateral,  also more frequent  urination has had back pain r/t disc problems. Pain is manageable so not taking anything. Pain 4-5/10 at its worse, 1/10 today. Used heating pad. Drinking tea, water, coffee.    Chronic concerns: T2DM, elevated ALT, Fatty liver, IBS, GERD.  Specialist  Labs   Review of Systems   Constitutional: Negative.    Respiratory: Negative.     Cardiovascular: Negative.    Gastrointestinal: Negative.    Genitourinary:  Positive for flank pain and urgency. Negative for decreased urine volume, difficulty urinating and dysuria.   Musculoskeletal:  Positive for back pain.   Neurological: Negative.      Objective   /78 (BP Location: Left arm, Patient Position: Sitting, BP Cuff Size: Large adult)   Pulse 81   Ht 1.753 m (5' 9\")   Wt 104 kg (228 lb 9.6 oz)   SpO2 95%   BMI 33.76 kg/m²   Weight 233 lbs     Physical Exam  Vitals reviewed.   Constitutional:       Appearance: He is obese.   Cardiovascular:      Rate and Rhythm: Normal rate and regular rhythm.      Heart sounds: Normal heart sounds.   Pulmonary:      Effort: Pulmonary effort is normal.      Breath " sounds: Normal breath sounds.   Abdominal:      General: Bowel sounds are normal.      Tenderness: There is no right CVA tenderness or left CVA tenderness.   Skin:     General: Skin is warm.   Neurological:      Mental Status: He is alert.      Coordination: Coordination is intact.      Gait: Gait is intact.       Assessment/Plan   Diagnoses and all orders for this visit:  Frequent urination  / Acute bilateral low back pain without sciatica  Patient is traveling out of country for work- for a month as of next 2 weeks- unable to do follow up appt.   Urine, US kidnesy and lab orders in computer.   Continue to drink water- 64 ounces/ Ibuprofen as directed  -     Comprehensive Metabolic Panel; Future  -     CBC and Auto Differential; Future  -     Urinalysis with Reflex Microscopic; Future  -     Urinalysis with Reflex Culture and Microscopic; Future  -     US renal complete; Future     Plan: Follow up pending results.

## 2024-02-23 LAB
ALBUMIN SERPL BCP-MCNC: 5.2 G/DL (ref 3.4–5)
ALP SERPL-CCNC: 77 U/L (ref 33–120)
ALT SERPL W P-5'-P-CCNC: 36 U/L (ref 10–52)
ANION GAP SERPL CALC-SCNC: 16 MMOL/L (ref 10–20)
APPEARANCE UR: CLEAR
AST SERPL W P-5'-P-CCNC: 18 U/L (ref 9–39)
BASOPHILS # BLD AUTO: 0.05 X10*3/UL (ref 0–0.1)
BASOPHILS NFR BLD AUTO: 0.5 %
BILIRUB SERPL-MCNC: 0.9 MG/DL (ref 0–1.2)
BILIRUB UR STRIP.AUTO-MCNC: NEGATIVE MG/DL
BUN SERPL-MCNC: 21 MG/DL (ref 6–23)
CALCIUM SERPL-MCNC: 10.4 MG/DL (ref 8.6–10.6)
CHLORIDE SERPL-SCNC: 101 MMOL/L (ref 98–107)
CO2 SERPL-SCNC: 26 MMOL/L (ref 21–32)
COLOR UR: COLORLESS
CREAT SERPL-MCNC: 0.99 MG/DL (ref 0.5–1.3)
EGFRCR SERPLBLD CKD-EPI 2021: >90 ML/MIN/1.73M*2
EOSINOPHIL # BLD AUTO: 0.44 X10*3/UL (ref 0–0.7)
EOSINOPHIL NFR BLD AUTO: 4.1 %
ERYTHROCYTE [DISTWIDTH] IN BLOOD BY AUTOMATED COUNT: 13.2 % (ref 11.5–14.5)
GLUCOSE SERPL-MCNC: 98 MG/DL (ref 74–99)
GLUCOSE UR STRIP.AUTO-MCNC: NORMAL MG/DL
HCT VFR BLD AUTO: 49 % (ref 41–52)
HGB BLD-MCNC: 15.9 G/DL (ref 13.5–17.5)
HOLD SPECIMEN: NORMAL
IMM GRANULOCYTES # BLD AUTO: 0.06 X10*3/UL (ref 0–0.7)
IMM GRANULOCYTES NFR BLD AUTO: 0.6 % (ref 0–0.9)
KETONES UR STRIP.AUTO-MCNC: NEGATIVE MG/DL
LEUKOCYTE ESTERASE UR QL STRIP.AUTO: NEGATIVE
LYMPHOCYTES # BLD AUTO: 2.02 X10*3/UL (ref 1.2–4.8)
LYMPHOCYTES NFR BLD AUTO: 18.9 %
MCH RBC QN AUTO: 29.2 PG (ref 26–34)
MCHC RBC AUTO-ENTMCNC: 32.4 G/DL (ref 32–36)
MCV RBC AUTO: 90 FL (ref 80–100)
MONOCYTES # BLD AUTO: 0.84 X10*3/UL (ref 0.1–1)
MONOCYTES NFR BLD AUTO: 7.9 %
NEUTROPHILS # BLD AUTO: 7.27 X10*3/UL (ref 1.2–7.7)
NEUTROPHILS NFR BLD AUTO: 68 %
NITRITE UR QL STRIP.AUTO: NEGATIVE
NRBC BLD-RTO: 0 /100 WBCS (ref 0–0)
PH UR STRIP.AUTO: 5 [PH]
PLATELET # BLD AUTO: 320 X10*3/UL (ref 150–450)
POTASSIUM SERPL-SCNC: 4.3 MMOL/L (ref 3.5–5.3)
PROT SERPL-MCNC: 7.7 G/DL (ref 6.4–8.2)
PROT UR STRIP.AUTO-MCNC: NEGATIVE MG/DL
RBC # BLD AUTO: 5.45 X10*6/UL (ref 4.5–5.9)
RBC # UR STRIP.AUTO: NEGATIVE /UL
SODIUM SERPL-SCNC: 139 MMOL/L (ref 136–145)
SP GR UR STRIP.AUTO: 1.01
UROBILINOGEN UR STRIP.AUTO-MCNC: NORMAL MG/DL
WBC # BLD AUTO: 10.7 X10*3/UL (ref 4.4–11.3)

## 2024-03-18 ENCOUNTER — HOSPITAL ENCOUNTER (OUTPATIENT)
Dept: RADIOLOGY | Facility: CLINIC | Age: 44
Discharge: HOME | End: 2024-03-18
Payer: COMMERCIAL

## 2024-03-18 DIAGNOSIS — M54.50 ACUTE BILATERAL LOW BACK PAIN WITHOUT SCIATICA: ICD-10-CM

## 2024-03-18 DIAGNOSIS — R35.0 FREQUENT URINATION: ICD-10-CM

## 2024-03-18 PROCEDURE — 76770 US EXAM ABDO BACK WALL COMP: CPT

## 2024-03-18 PROCEDURE — 76770 US EXAM ABDO BACK WALL COMP: CPT | Performed by: RADIOLOGY

## 2024-04-24 ENCOUNTER — LAB (OUTPATIENT)
Dept: LAB | Facility: LAB | Age: 44
End: 2024-04-24
Payer: COMMERCIAL

## 2024-04-24 DIAGNOSIS — E11.9 TYPE 2 DIABETES MELLITUS WITHOUT COMPLICATION, WITHOUT LONG-TERM CURRENT USE OF INSULIN (MULTI): ICD-10-CM

## 2024-04-24 DIAGNOSIS — K76.0 FATTY LIVER: ICD-10-CM

## 2024-04-24 LAB
ALBUMIN SERPL BCP-MCNC: 4.8 G/DL (ref 3.4–5)
ALP SERPL-CCNC: 54 U/L (ref 33–120)
ALT SERPL W P-5'-P-CCNC: 17 U/L (ref 10–52)
ANION GAP SERPL CALC-SCNC: 15 MMOL/L (ref 10–20)
AST SERPL W P-5'-P-CCNC: 13 U/L (ref 9–39)
BILIRUB SERPL-MCNC: 1.1 MG/DL (ref 0–1.2)
BUN SERPL-MCNC: 20 MG/DL (ref 6–23)
CALCIUM SERPL-MCNC: 10.3 MG/DL (ref 8.6–10.6)
CHLORIDE SERPL-SCNC: 100 MMOL/L (ref 98–107)
CO2 SERPL-SCNC: 31 MMOL/L (ref 21–32)
CREAT SERPL-MCNC: 0.93 MG/DL (ref 0.5–1.3)
EGFRCR SERPLBLD CKD-EPI 2021: >90 ML/MIN/1.73M*2
EST. AVERAGE GLUCOSE BLD GHB EST-MCNC: 128 MG/DL
GLUCOSE SERPL-MCNC: 117 MG/DL (ref 74–99)
HBA1C MFR BLD: 6.1 %
POTASSIUM SERPL-SCNC: 4.5 MMOL/L (ref 3.5–5.3)
PROT SERPL-MCNC: 7.6 G/DL (ref 6.4–8.2)
SODIUM SERPL-SCNC: 141 MMOL/L (ref 136–145)

## 2024-04-24 PROCEDURE — 83036 HEMOGLOBIN GLYCOSYLATED A1C: CPT

## 2024-04-24 PROCEDURE — 36415 COLL VENOUS BLD VENIPUNCTURE: CPT

## 2024-04-24 PROCEDURE — 80053 COMPREHEN METABOLIC PANEL: CPT

## 2024-05-02 ENCOUNTER — OFFICE VISIT (OUTPATIENT)
Dept: PRIMARY CARE | Facility: CLINIC | Age: 44
End: 2024-05-02
Payer: COMMERCIAL

## 2024-05-02 VITALS
HEIGHT: 69 IN | DIASTOLIC BLOOD PRESSURE: 76 MMHG | OXYGEN SATURATION: 96 % | HEART RATE: 55 BPM | SYSTOLIC BLOOD PRESSURE: 126 MMHG | WEIGHT: 230 LBS | BODY MASS INDEX: 34.07 KG/M2

## 2024-05-02 DIAGNOSIS — E11.9 TYPE 2 DIABETES MELLITUS WITHOUT COMPLICATION, WITHOUT LONG-TERM CURRENT USE OF INSULIN (MULTI): Primary | ICD-10-CM

## 2024-05-02 DIAGNOSIS — E66.09 CLASS 1 OBESITY DUE TO EXCESS CALORIES WITH SERIOUS COMORBIDITY AND BODY MASS INDEX (BMI) OF 33.0 TO 33.9 IN ADULT: ICD-10-CM

## 2024-05-02 DIAGNOSIS — J30.2 SEASONAL ALLERGIES: ICD-10-CM

## 2024-05-02 DIAGNOSIS — Z00.00 ENCOUNTER FOR PREVENTATIVE ADULT HEALTH CARE EXAMINATION: ICD-10-CM

## 2024-05-02 DIAGNOSIS — Z12.5 PROSTATE CANCER SCREENING: ICD-10-CM

## 2024-05-02 DIAGNOSIS — F43.0 STRESS REACTION: ICD-10-CM

## 2024-05-02 PROCEDURE — 1036F TOBACCO NON-USER: CPT | Performed by: INTERNAL MEDICINE

## 2024-05-02 PROCEDURE — 3074F SYST BP LT 130 MM HG: CPT | Performed by: INTERNAL MEDICINE

## 2024-05-02 PROCEDURE — 3078F DIAST BP <80 MM HG: CPT | Performed by: INTERNAL MEDICINE

## 2024-05-02 PROCEDURE — 3044F HG A1C LEVEL LT 7.0%: CPT | Performed by: INTERNAL MEDICINE

## 2024-05-02 PROCEDURE — 3008F BODY MASS INDEX DOCD: CPT | Performed by: INTERNAL MEDICINE

## 2024-05-02 PROCEDURE — 99214 OFFICE O/P EST MOD 30 MIN: CPT | Performed by: INTERNAL MEDICINE

## 2024-05-02 ASSESSMENT — ENCOUNTER SYMPTOMS
SHORTNESS OF BREATH: 0
DIZZINESS: 0
BLURRED VISION: 0
POLYDIPSIA: 0
VISUAL CHANGE: 0
FATIGUE: 0
DIABETIC ASSOCIATED SYMPTOMS: 0
POLYPHAGIA: 0

## 2024-05-02 NOTE — PROGRESS NOTES
"Subjective   Patient ID: Erica Dubose is a 43 y.o. male who presents for Follow-up chronic medical problems.    Doing ok with sertraline.  Less stress.  Has librax for IBS.  Not sleeping well.  Awakens middle of night.  Has used melatonin but not every night.  Meds and labs reviewed.      Diabetes  He presents for his follow-up diabetic visit. He has type 2 diabetes mellitus. His disease course has been stable. There are no hypoglycemic associated symptoms. Pertinent negatives for hypoglycemia include no dizziness. There are no diabetic associated symptoms. Pertinent negatives for diabetes include no blurred vision, no chest pain, no fatigue, no foot paresthesias, no foot ulcerations, no polydipsia, no polyphagia, no polyuria and no visual change. There are no hypoglycemic complications. Symptoms are stable. There are no diabetic complications. Current diabetic treatment includes diet. He is compliant with treatment most of the time.        Review of Systems   Constitutional:  Negative for fatigue.   Eyes:  Negative for blurred vision.   Respiratory:  Negative for shortness of breath.    Cardiovascular:  Negative for chest pain.   Endocrine: Negative for polydipsia, polyphagia and polyuria.   Neurological:  Negative for dizziness.       Objective   /76 (BP Location: Right arm, Patient Position: Sitting)   Pulse 55   Ht 1.753 m (5' 9\")   Wt 104 kg (230 lb)   SpO2 96%   BMI 33.97 kg/m²     Physical Exam  Constitutional:       Appearance: Normal appearance. He is obese.   Cardiovascular:      Rate and Rhythm: Normal rate and regular rhythm.      Pulses: Normal pulses.      Heart sounds: Normal heart sounds.   Pulmonary:      Effort: Pulmonary effort is normal.      Breath sounds: Normal breath sounds.   Neurological:      General: No focal deficit present.      Mental Status: He is alert.   Psychiatric:         Mood and Affect: Mood normal.         Behavior: Behavior normal.         Thought Content: " Thought content normal.         Judgment: Judgment normal.       Assessment/Plan   Problem List Items Addressed This Visit             ICD-10-CM    Stress reaction F43.0     Mood overall improved with sertraline.         Type 2 diabetes mellitus without complication, without long-term current use of insulin ([facility]) - Primary E11.9     A1c 6.1 with diet.         Relevant Orders    Hemoglobin A1C    Albumin, urine, random    Class 1 obesity due to excess calories with serious comorbidity and body mass index (BMI) of 33.0 to 33.9 in adult E66.09, Z68.33     Recommend diet, exercise and weight management.           Prostate cancer screening Z12.5    Relevant Orders    PSA    Seasonal allergies J30.2     Stop afrin spray.  Rec otc flonase nasal spray 2 puffs each nostril daily

## 2024-06-13 DIAGNOSIS — F43.0 ACUTE STRESS REACTION: ICD-10-CM

## 2024-06-13 RX ORDER — SERTRALINE HYDROCHLORIDE 50 MG/1
TABLET, FILM COATED ORAL
Qty: 90 TABLET | Refills: 1 | Status: SHIPPED | OUTPATIENT
Start: 2024-06-13

## 2024-06-26 ENCOUNTER — TELEPHONE (OUTPATIENT)
Dept: PRIMARY CARE | Facility: CLINIC | Age: 44
End: 2024-06-26
Payer: COMMERCIAL
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
## 2024-06-27 ENCOUNTER — OFFICE VISIT (OUTPATIENT)
Dept: PRIMARY CARE | Facility: CLINIC | Age: 44
End: 2024-06-27
Payer: COMMERCIAL

## 2024-06-27 VITALS
HEART RATE: 68 BPM | OXYGEN SATURATION: 99 % | BODY MASS INDEX: 34.36 KG/M2 | HEIGHT: 69 IN | WEIGHT: 232 LBS | SYSTOLIC BLOOD PRESSURE: 122 MMHG | DIASTOLIC BLOOD PRESSURE: 70 MMHG

## 2024-06-27 DIAGNOSIS — S06.0X0A CONCUSSION WITHOUT LOSS OF CONSCIOUSNESS, INITIAL ENCOUNTER: Primary | ICD-10-CM

## 2024-06-27 PROCEDURE — 3044F HG A1C LEVEL LT 7.0%: CPT | Performed by: INTERNAL MEDICINE

## 2024-06-27 PROCEDURE — 3074F SYST BP LT 130 MM HG: CPT | Performed by: INTERNAL MEDICINE

## 2024-06-27 PROCEDURE — 99213 OFFICE O/P EST LOW 20 MIN: CPT | Performed by: INTERNAL MEDICINE

## 2024-06-27 PROCEDURE — 3078F DIAST BP <80 MM HG: CPT | Performed by: INTERNAL MEDICINE

## 2024-06-27 PROCEDURE — 1036F TOBACCO NON-USER: CPT | Performed by: INTERNAL MEDICINE

## 2024-06-27 PROCEDURE — 3008F BODY MASS INDEX DOCD: CPT | Performed by: INTERNAL MEDICINE

## 2024-06-27 ASSESSMENT — ENCOUNTER SYMPTOMS
DIZZINESS: 1
NAUSEA: 1
VOMITING: 1
FATIGUE: 1
HEADACHES: 1

## 2024-06-27 NOTE — ASSESSMENT & PLAN NOTE
Otc analgesics, tylenol as needed.  Rest, hydration discussed.  Avoid excessive computer work.  Work letter, rec remote work x 2 weeks.  Call if no improvement.
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
## 2024-06-27 NOTE — LETTER
June 27, 2024     Patient: Erica Dubose   YOB: 1980   Date of Visit: 6/27/2024       To Whom It May Concern:    Erica Dubose was seen in my clinic on 6/27/2024 at 11:00 am. Please allow Erica to work remotely for the next 2 weeks due to an acute head injury. Erica can return to work full duty on July 11, 2024.    If you have any questions or concerns, please don't hesitate to call.         Sincerely,         Kane Her MD        CC: No Recipients

## 2024-06-28 ENCOUNTER — APPOINTMENT (OUTPATIENT)
Dept: PRIMARY CARE | Facility: CLINIC | Age: 44
End: 2024-06-28
Payer: COMMERCIAL

## 2024-07-11 ENCOUNTER — HOSPITAL ENCOUNTER (OUTPATIENT)
Dept: RADIOLOGY | Facility: CLINIC | Age: 44
Discharge: HOME | End: 2024-07-11
Payer: COMMERCIAL

## 2024-07-11 DIAGNOSIS — S06.0X0A CONCUSSION WITHOUT LOSS OF CONSCIOUSNESS, INITIAL ENCOUNTER: ICD-10-CM

## 2024-07-11 PROCEDURE — 70450 CT HEAD/BRAIN W/O DYE: CPT | Performed by: RADIOLOGY

## 2024-07-11 PROCEDURE — 70450 CT HEAD/BRAIN W/O DYE: CPT

## 2024-07-12 ENCOUNTER — TELEPHONE (OUTPATIENT)
Dept: PRIMARY CARE | Facility: CLINIC | Age: 44
End: 2024-07-12
Payer: COMMERCIAL

## 2024-07-12 NOTE — TELEPHONE ENCOUNTER
----- Message from Kane Her sent at 7/12/2024 10:50 AM EDT -----  CT head  Negative for any acute pathology

## 2024-08-13 ENCOUNTER — HOSPITAL ENCOUNTER (OUTPATIENT)
Dept: RADIOLOGY | Facility: CLINIC | Age: 44
Discharge: HOME | End: 2024-08-13
Payer: COMMERCIAL

## 2024-08-13 ENCOUNTER — OFFICE VISIT (OUTPATIENT)
Dept: PRIMARY CARE | Facility: CLINIC | Age: 44
End: 2024-08-13
Payer: COMMERCIAL

## 2024-08-13 VITALS
OXYGEN SATURATION: 98 % | BODY MASS INDEX: 34.16 KG/M2 | SYSTOLIC BLOOD PRESSURE: 120 MMHG | HEIGHT: 69 IN | HEART RATE: 58 BPM | DIASTOLIC BLOOD PRESSURE: 78 MMHG | WEIGHT: 230.6 LBS

## 2024-08-13 DIAGNOSIS — M54.40 BACK PAIN OF LUMBAR REGION WITH SCIATICA: ICD-10-CM

## 2024-08-13 DIAGNOSIS — M25.561 CHRONIC PAIN OF RIGHT KNEE: ICD-10-CM

## 2024-08-13 DIAGNOSIS — G89.29 CHRONIC PAIN OF RIGHT KNEE: ICD-10-CM

## 2024-08-13 DIAGNOSIS — M54.40 BACK PAIN OF LUMBAR REGION WITH SCIATICA: Primary | ICD-10-CM

## 2024-08-13 PROCEDURE — 72100 X-RAY EXAM L-S SPINE 2/3 VWS: CPT | Performed by: RADIOLOGY

## 2024-08-13 PROCEDURE — 3008F BODY MASS INDEX DOCD: CPT | Performed by: NURSE PRACTITIONER

## 2024-08-13 PROCEDURE — 3044F HG A1C LEVEL LT 7.0%: CPT | Performed by: NURSE PRACTITIONER

## 2024-08-13 PROCEDURE — 72100 X-RAY EXAM L-S SPINE 2/3 VWS: CPT

## 2024-08-13 PROCEDURE — 1036F TOBACCO NON-USER: CPT | Performed by: NURSE PRACTITIONER

## 2024-08-13 PROCEDURE — 3078F DIAST BP <80 MM HG: CPT | Performed by: NURSE PRACTITIONER

## 2024-08-13 PROCEDURE — 3074F SYST BP LT 130 MM HG: CPT | Performed by: NURSE PRACTITIONER

## 2024-08-13 PROCEDURE — 99213 OFFICE O/P EST LOW 20 MIN: CPT | Performed by: NURSE PRACTITIONER

## 2024-08-13 RX ORDER — TIZANIDINE 2 MG/1
2 TABLET ORAL 2 TIMES DAILY
COMMUNITY
Start: 2024-08-08 | End: 2024-08-13 | Stop reason: SDUPTHER

## 2024-08-13 RX ORDER — TIZANIDINE 2 MG/1
2 TABLET ORAL 2 TIMES DAILY
Qty: 30 TABLET | Refills: 0 | Status: SHIPPED | OUTPATIENT
Start: 2024-08-13

## 2024-08-13 RX ORDER — GABAPENTIN 100 MG/1
100 CAPSULE ORAL 3 TIMES DAILY PRN
Qty: 90 CAPSULE | Refills: 0 | Status: SHIPPED | OUTPATIENT
Start: 2024-08-13 | End: 2025-02-09

## 2024-08-13 RX ORDER — IBUPROFEN 600 MG/1
600 TABLET ORAL EVERY 6 HOURS PRN
COMMUNITY
Start: 2024-08-08

## 2024-08-13 ASSESSMENT — ENCOUNTER SYMPTOMS
CONSTITUTIONAL NEGATIVE: 1
BACK PAIN: 1
MYALGIAS: 1
GASTROINTESTINAL NEGATIVE: 1
ARTHRALGIAS: 1
CARDIOVASCULAR NEGATIVE: 1
RESPIRATORY NEGATIVE: 1

## 2024-08-13 NOTE — PROGRESS NOTES
Subjective   Patient ID: Erica Dubose is a 44 y.o. male who presents for ER Follow-up (RB patient here for ER follow up for back pain. Per pt he's been working under a lot of stress lately, his wife was out of state for family being in the hospital, his dad was in surgery and his daughter was away. He was working for many hours straight and he's been extremely fatigued. He ended up having to wake up and call 911 due to the amount of severe pain he had in his back. He's had disc ussies in the past about 20 years ago. /LOV 6/27/24/NOV 11/12/24) and Back Pain (Was taken to [facility], was given morphine and it helped. His dr in the ER told him he needs to take time off work because sitting at a desk for this long is doing more damage to his back. They informed him to do Short term disability to take time off work. /He had pain from his right hip up his back and down to his knee. He also now has numbness from his right knee down to his foot. He does a lot of sitting for work, desk job in an office. He's a . ).    HPI   Patient of Dr Her here for follow up ER. Last office visit on 06/27/2024  Current concern:  1) ER for back pain- [facility] 08/08 Thursday-no documentation to review form visit, Patient reports he is working many long hours sitting at desk, Was told by ER doctor needs to take time off work (short term disability)- patient reports he called company and awaiting to hear from them.  Pain radiated from right hip up back and down to his knee. Numbness in right knee->right foot.  Received morphine injection, Oxycodone - Acetaminophen 5- 325 (#9 tablets) and rx Tizanidine, taking  Ibuprofen 600 mg every 12 hours. Pain with sitting-> standing, pain feeling like it is to the bone, after standing few minutes pain returns.  Has had back pain issues for 20 years, seen PT years ago, but does not continue to do exercises.   Has been resting was told to rest through 08/16/2024 (Friday).   " Pain rating today 3-4/10 - when at  ER 10/10.  Had been dx  \"disc concerns\" but no imaging available in EMR.    2) right knee pain- currently wrapped in neoprene knee wrap- Had seen orthopedic 2 years. Wants establish with new orthopedic provider.    Chronic concerns: T2DM, elevated ALT, Fatty liver, IBS, GERD.    Review of Systems   Constitutional: Negative.    Respiratory: Negative.     Cardiovascular: Negative.    Gastrointestinal: Negative.    Genitourinary: Negative.    Musculoskeletal:  Positive for arthralgias, back pain (as stated in HPI), gait problem and myalgias.     Objective   /78 (BP Location: Right arm, Patient Position: Sitting, BP Cuff Size: Large adult)   Pulse 58   Ht 1.753 m (5' 9\")   Wt 105 kg (230 lb 9.6 oz)   SpO2 98%   BMI 34.05 kg/m²     Physical Exam  Vitals reviewed.   Constitutional:       Appearance: He is obese.   Cardiovascular:      Rate and Rhythm: Normal rate and regular rhythm.      Heart sounds: Normal heart sounds.   Pulmonary:      Effort: Pulmonary effort is normal.      Breath sounds: Normal breath sounds. No decreased breath sounds or wheezing.   Musculoskeletal:      Cervical back: Neck supple. No pain with movement. Normal range of motion.      Thoracic back: Normal.      Lumbar back: Tenderness present. Normal range of motion.      Right lower leg: No edema.      Left lower leg: No edema.   Neurological:      Mental Status: He is alert.      Cranial Nerves: Cranial nerves 2-12 are intact.      Coordination: Coordination is intact.      Deep Tendon Reflexes:      Reflex Scores:       Patellar reflexes are 2+ on the right side and 1+ on the left side.      Assessment/Plan   Diagnoses and all orders for this visit:  Back pain of lumbar region with sciatic  Apply Heat or ice on back 15 minutes then off for 2 hours repeat as needed  Exercises as printed on after visit summary- start conservatively   Start Gabapentin, continue with Ibuprofen   Will complete FMLA " paperwork once received.   -     XR lumbar spine 2-3 views; Future  -     Referral to Physical Therapy; Future  - initiated  gabapentin (Neurontin) 100 mg capsule; Take 1 capsule (100 mg) by mouth 3 times a day as needed (sciatica pain). May increase dose to 200 mg three x day on # day 3. If needed.  - refilled  tiZANidine (Zanaflex) 2 mg tablet; Take 1 tablet (2 mg) by mouth 2 times a day.  Chronic pain of right knee  -     Referral to Orthopaedic Surgery; Future- check with insurance for local provider.      PLAN:  follow up as scheduled with Dr Her

## 2024-08-13 NOTE — PATIENT INSTRUCTIONS
Heat or ice on back 15 minutes then off for 2 hours repeat as needed  Exercises as printed on after visit summary  Referral to Physical therapy  Start Gabapentin, continue with Ibuprofen

## 2024-08-16 NOTE — RESULT ENCOUNTER NOTE
"X-ray shows \"mild facet joint arthropathy L4-L5 and L5-S1\" meaning arthritis that is degenerative. Mild levels as described are typically treated as we discussed in office with anti-inflammatories (over the counter Ibuprofen) pain relievers such as Tylenol and Gabapentin that prescribed may be helpful, heat or ice, strengthening the core muscles and if you would like physical therapy.  If that fails to improve symptoms referral to spine orthopedic for further evaluation. "

## 2024-11-06 ENCOUNTER — LAB (OUTPATIENT)
Dept: LAB | Facility: LAB | Age: 44
End: 2024-11-06
Payer: COMMERCIAL

## 2024-11-06 DIAGNOSIS — Z12.5 PROSTATE CANCER SCREENING: ICD-10-CM

## 2024-11-06 DIAGNOSIS — R17 TOTAL BILIRUBIN, ELEVATED: ICD-10-CM

## 2024-11-06 DIAGNOSIS — Z00.00 ENCOUNTER FOR PREVENTATIVE ADULT HEALTH CARE EXAMINATION: ICD-10-CM

## 2024-11-06 DIAGNOSIS — E11.9 TYPE 2 DIABETES MELLITUS WITHOUT COMPLICATION, WITHOUT LONG-TERM CURRENT USE OF INSULIN (MULTI): ICD-10-CM

## 2024-11-06 LAB
25(OH)D3 SERPL-MCNC: 15 NG/ML (ref 30–100)
ALBUMIN SERPL BCP-MCNC: 5.3 G/DL (ref 3.4–5)
ALP SERPL-CCNC: 70 U/L (ref 33–120)
ALT SERPL W P-5'-P-CCNC: 29 U/L (ref 10–52)
ANION GAP SERPL CALC-SCNC: 14 MMOL/L (ref 10–20)
APPEARANCE UR: CLEAR
AST SERPL W P-5'-P-CCNC: 15 U/L (ref 9–39)
BASOPHILS # BLD AUTO: 0.06 X10*3/UL (ref 0–0.1)
BASOPHILS NFR BLD AUTO: 0.6 %
BILIRUB SERPL-MCNC: 1.6 MG/DL (ref 0–1.2)
BILIRUB UR STRIP.AUTO-MCNC: NEGATIVE MG/DL
BUN SERPL-MCNC: 23 MG/DL (ref 6–23)
CALCIUM SERPL-MCNC: 10.7 MG/DL (ref 8.6–10.6)
CHLORIDE SERPL-SCNC: 99 MMOL/L (ref 98–107)
CHOLEST SERPL-MCNC: 202 MG/DL (ref 0–199)
CHOLESTEROL/HDL RATIO: 3.8
CO2 SERPL-SCNC: 30 MMOL/L (ref 21–32)
COLOR UR: NORMAL
CREAT SERPL-MCNC: 0.88 MG/DL (ref 0.5–1.3)
CREAT UR-MCNC: 145 MG/DL (ref 20–370)
EGFRCR SERPLBLD CKD-EPI 2021: >90 ML/MIN/1.73M*2
EOSINOPHIL # BLD AUTO: 0.19 X10*3/UL (ref 0–0.7)
EOSINOPHIL NFR BLD AUTO: 2 %
ERYTHROCYTE [DISTWIDTH] IN BLOOD BY AUTOMATED COUNT: 12.7 % (ref 11.5–14.5)
EST. AVERAGE GLUCOSE BLD GHB EST-MCNC: 143 MG/DL
GLUCOSE SERPL-MCNC: 113 MG/DL (ref 74–99)
GLUCOSE UR STRIP.AUTO-MCNC: NORMAL MG/DL
HBA1C MFR BLD: 6.6 %
HCT VFR BLD AUTO: 48.9 % (ref 41–52)
HDLC SERPL-MCNC: 52.7 MG/DL
HGB BLD-MCNC: 16 G/DL (ref 13.5–17.5)
IMM GRANULOCYTES # BLD AUTO: 0.04 X10*3/UL (ref 0–0.7)
IMM GRANULOCYTES NFR BLD AUTO: 0.4 % (ref 0–0.9)
KETONES UR STRIP.AUTO-MCNC: NEGATIVE MG/DL
LDLC SERPL CALC-MCNC: 124 MG/DL
LEUKOCYTE ESTERASE UR QL STRIP.AUTO: NEGATIVE
LYMPHOCYTES # BLD AUTO: 3.1 X10*3/UL (ref 1.2–4.8)
LYMPHOCYTES NFR BLD AUTO: 32.2 %
MCH RBC QN AUTO: 28.9 PG (ref 26–34)
MCHC RBC AUTO-ENTMCNC: 32.7 G/DL (ref 32–36)
MCV RBC AUTO: 88 FL (ref 80–100)
MICROALBUMIN UR-MCNC: 20.7 MG/L
MICROALBUMIN/CREAT UR: 14.3 UG/MG CREAT
MONOCYTES # BLD AUTO: 0.55 X10*3/UL (ref 0.1–1)
MONOCYTES NFR BLD AUTO: 5.7 %
NEUTROPHILS # BLD AUTO: 5.69 X10*3/UL (ref 1.2–7.7)
NEUTROPHILS NFR BLD AUTO: 59.1 %
NITRITE UR QL STRIP.AUTO: NEGATIVE
NON HDL CHOLESTEROL: 149 MG/DL (ref 0–149)
NRBC BLD-RTO: 0 /100 WBCS (ref 0–0)
PH UR STRIP.AUTO: 5.5 [PH]
PLATELET # BLD AUTO: 349 X10*3/UL (ref 150–450)
POTASSIUM SERPL-SCNC: 4.4 MMOL/L (ref 3.5–5.3)
PROT SERPL-MCNC: 7.8 G/DL (ref 6.4–8.2)
PROT UR STRIP.AUTO-MCNC: NEGATIVE MG/DL
PSA SERPL-MCNC: 0.33 NG/ML
RBC # BLD AUTO: 5.53 X10*6/UL (ref 4.5–5.9)
RBC # UR STRIP.AUTO: NEGATIVE /UL
SODIUM SERPL-SCNC: 139 MMOL/L (ref 136–145)
SP GR UR STRIP.AUTO: 1.02
T4 FREE SERPL-MCNC: 1.22 NG/DL (ref 0.78–1.48)
TRIGL SERPL-MCNC: 129 MG/DL (ref 0–149)
TSH SERPL-ACNC: 4.04 MIU/L (ref 0.44–3.98)
UROBILINOGEN UR STRIP.AUTO-MCNC: NORMAL MG/DL
VLDL: 26 MG/DL (ref 0–40)
WBC # BLD AUTO: 9.6 X10*3/UL (ref 4.4–11.3)

## 2024-11-06 PROCEDURE — 82570 ASSAY OF URINE CREATININE: CPT

## 2024-11-06 PROCEDURE — 82306 VITAMIN D 25 HYDROXY: CPT

## 2024-11-06 PROCEDURE — 83036 HEMOGLOBIN GLYCOSYLATED A1C: CPT

## 2024-11-06 PROCEDURE — 85025 COMPLETE CBC W/AUTO DIFF WBC: CPT

## 2024-11-06 PROCEDURE — 80061 LIPID PANEL: CPT

## 2024-11-06 PROCEDURE — 36415 COLL VENOUS BLD VENIPUNCTURE: CPT

## 2024-11-06 PROCEDURE — 82248 BILIRUBIN DIRECT: CPT

## 2024-11-06 PROCEDURE — 81003 URINALYSIS AUTO W/O SCOPE: CPT

## 2024-11-06 PROCEDURE — 84443 ASSAY THYROID STIM HORMONE: CPT

## 2024-11-06 PROCEDURE — 82043 UR ALBUMIN QUANTITATIVE: CPT

## 2024-11-06 PROCEDURE — 84153 ASSAY OF PSA TOTAL: CPT

## 2024-11-06 PROCEDURE — 84439 ASSAY OF FREE THYROXINE: CPT

## 2024-11-06 PROCEDURE — 80053 COMPREHEN METABOLIC PANEL: CPT

## 2024-11-08 DIAGNOSIS — R17 TOTAL BILIRUBIN, ELEVATED: Primary | ICD-10-CM

## 2024-11-08 LAB — BILIRUB DIRECT SERPL-MCNC: 0.2 MG/DL (ref 0–0.3)

## 2024-11-12 ENCOUNTER — APPOINTMENT (OUTPATIENT)
Dept: PRIMARY CARE | Facility: CLINIC | Age: 44
End: 2024-11-12
Payer: COMMERCIAL

## 2024-11-12 VITALS
OXYGEN SATURATION: 96 % | WEIGHT: 230 LBS | HEIGHT: 69 IN | HEART RATE: 74 BPM | SYSTOLIC BLOOD PRESSURE: 128 MMHG | DIASTOLIC BLOOD PRESSURE: 72 MMHG | BODY MASS INDEX: 34.07 KG/M2

## 2024-11-12 DIAGNOSIS — Z12.5 PROSTATE CANCER SCREENING: ICD-10-CM

## 2024-11-12 DIAGNOSIS — E11.9 TYPE 2 DIABETES MELLITUS WITHOUT COMPLICATION, WITHOUT LONG-TERM CURRENT USE OF INSULIN (MULTI): ICD-10-CM

## 2024-11-12 DIAGNOSIS — Z00.00 ENCOUNTER FOR PREVENTATIVE ADULT HEALTH CARE EXAMINATION: Primary | ICD-10-CM

## 2024-11-12 PROBLEM — S06.0X0A CONCUSSION WITH NO LOSS OF CONSCIOUSNESS: Status: RESOLVED | Noted: 2024-06-27 | Resolved: 2024-11-12

## 2024-11-12 PROCEDURE — 3008F BODY MASS INDEX DOCD: CPT | Performed by: INTERNAL MEDICINE

## 2024-11-12 PROCEDURE — 3061F NEG MICROALBUMINURIA REV: CPT | Performed by: INTERNAL MEDICINE

## 2024-11-12 PROCEDURE — 3074F SYST BP LT 130 MM HG: CPT | Performed by: INTERNAL MEDICINE

## 2024-11-12 PROCEDURE — 3049F LDL-C 100-129 MG/DL: CPT | Performed by: INTERNAL MEDICINE

## 2024-11-12 PROCEDURE — 3044F HG A1C LEVEL LT 7.0%: CPT | Performed by: INTERNAL MEDICINE

## 2024-11-12 PROCEDURE — 3078F DIAST BP <80 MM HG: CPT | Performed by: INTERNAL MEDICINE

## 2024-11-12 PROCEDURE — 99396 PREV VISIT EST AGE 40-64: CPT | Performed by: INTERNAL MEDICINE

## 2024-11-12 PROCEDURE — 1036F TOBACCO NON-USER: CPT | Performed by: INTERNAL MEDICINE

## 2024-11-12 RX ORDER — CHOLECALCIFEROL (VITAMIN D3) 50 MCG
50 TABLET ORAL DAILY
COMMUNITY

## 2024-11-12 ASSESSMENT — ENCOUNTER SYMPTOMS
CARDIOVASCULAR NEGATIVE: 1
BACK PAIN: 1
ALLERGIC/IMMUNOLOGIC NEGATIVE: 1
NEUROLOGICAL NEGATIVE: 1
CONSTITUTIONAL NEGATIVE: 1
PSYCHIATRIC NEGATIVE: 1
HEMATOLOGIC/LYMPHATIC NEGATIVE: 1
ENDOCRINE NEGATIVE: 1
GASTROINTESTINAL NEGATIVE: 1
RESPIRATORY NEGATIVE: 1
EYES NEGATIVE: 1

## 2024-11-12 NOTE — PROGRESS NOTES
"Subjective   Patient ID: Erica Dubose is a 44 y.o. male who presents for Annual Exam.    Well Adult Physical   Patient here for a comprehensive physical exam.  The patient reports problems - IBS symptoms, using librax as needed.  Handled with GI.  No heartburn with PPI.  Mood/stress improved with sertraline.  Chiropractor handling back pain.  PM opined epidural injection.  Feels much better after injection  Weight down 10 pounds past year.  Meds and labs reviewed.    Do you take any herbs or supplements that were not prescribed by a doctor? yes   Are you taking calcium supplements? no   Are you taking aspirin daily? No    PSA:     Colonoscopy: , check 5 years         Review of Systems   Constitutional: Negative.    HENT: Negative.     Eyes: Negative.    Respiratory: Negative.     Cardiovascular: Negative.    Gastrointestinal: Negative.    Endocrine: Negative.    Genitourinary: Negative.    Musculoskeletal:  Positive for back pain.   Skin: Negative.    Allergic/Immunologic: Negative.    Neurological: Negative.    Hematological: Negative.    Psychiatric/Behavioral: Negative.         Objective   /72 (BP Location: Left arm, Patient Position: Sitting)   Pulse 74   Ht 1.753 m (5' 9\")   Wt 104 kg (230 lb)   SpO2 96%   BMI 33.97 kg/m²     Physical Exam  Vitals and nursing note reviewed.   Constitutional:       Appearance: Normal appearance.   HENT:      Head: Normocephalic and atraumatic.      Right Ear: Tympanic membrane normal.      Left Ear: Tympanic membrane normal.      Nose: Nose normal.      Mouth/Throat:      Pharynx: Oropharynx is clear.   Eyes:      Extraocular Movements: Extraocular movements intact.      Conjunctiva/sclera: Conjunctivae normal.      Pupils: Pupils are equal, round, and reactive to light.   Cardiovascular:      Rate and Rhythm: Normal rate and regular rhythm.      Pulses: Normal pulses.      Heart sounds: Normal heart sounds.   Pulmonary:      Effort: Pulmonary " effort is normal.      Breath sounds: Normal breath sounds.   Abdominal:      General: Bowel sounds are normal.      Palpations: Abdomen is soft.      Hernia: No hernia is present.   Genitourinary:     Penis: Normal.       Testes: Normal.   Musculoskeletal:         General: Normal range of motion.      Cervical back: Normal range of motion and neck supple.   Skin:     General: Skin is warm and dry.   Neurological:      General: No focal deficit present.      Mental Status: He is alert and oriented to person, place, and time. Mental status is at baseline.   Psychiatric:         Mood and Affect: Mood normal.         Behavior: Behavior normal.         Thought Content: Thought content normal.         Judgment: Judgment normal.         Assessment/Plan     Adult Health Maintenance  Labs reviewed.  Pt requests endocrinology referral for DM and GLP-1 meds.  Lipids, LDL not at goal without statin.  Recommend low fat/cholesterol diet.  Discussed statin given DM history.  Mood gaye with SSRI.  GERD improved with H2 blocker or PPI.  Recommend diet, exercise and weight management.  Fu 6 months.    Problem List Items Addressed This Visit             ICD-10-CM    Type 2 diabetes mellitus without complication, without long-term current use of insulin (Multi) E11.9    Relevant Orders    Referral to Endocrinology    Prostate cancer screening - Primary Z12.5

## 2024-12-11 ENCOUNTER — APPOINTMENT (OUTPATIENT)
Dept: ENDOCRINOLOGY | Facility: CLINIC | Age: 44
End: 2024-12-11
Payer: COMMERCIAL

## 2024-12-12 ENCOUNTER — TELEPHONE (OUTPATIENT)
Dept: PRIMARY CARE | Facility: CLINIC | Age: 44
End: 2024-12-12
Payer: COMMERCIAL

## 2024-12-12 NOTE — TELEPHONE ENCOUNTER
Pt found another endocrinologist for  might need new referral  [facility]  dr alberto Sanchez, pts appt is tomorrow please advise

## 2024-12-16 DIAGNOSIS — F43.0 ACUTE STRESS REACTION: ICD-10-CM

## 2024-12-16 RX ORDER — SERTRALINE HYDROCHLORIDE 50 MG/1
50 TABLET, FILM COATED ORAL DAILY
Qty: 90 TABLET | Refills: 1 | Status: SHIPPED | OUTPATIENT
Start: 2024-12-16

## 2025-03-10 ENCOUNTER — APPOINTMENT (OUTPATIENT)
Dept: PRIMARY CARE | Facility: CLINIC | Age: 45
End: 2025-03-10
Payer: COMMERCIAL

## 2025-03-10 VITALS
WEIGHT: 228 LBS | HEART RATE: 72 BPM | BODY MASS INDEX: 33.77 KG/M2 | DIASTOLIC BLOOD PRESSURE: 82 MMHG | SYSTOLIC BLOOD PRESSURE: 132 MMHG | HEIGHT: 69 IN | OXYGEN SATURATION: 98 %

## 2025-03-10 DIAGNOSIS — R19.7 DIARRHEA, UNSPECIFIED TYPE: Primary | ICD-10-CM

## 2025-03-10 DIAGNOSIS — R09.81 CONGESTION OF PARANASAL SINUS: ICD-10-CM

## 2025-03-10 DIAGNOSIS — K21.9 GASTROESOPHAGEAL REFLUX DISEASE, UNSPECIFIED WHETHER ESOPHAGITIS PRESENT: ICD-10-CM

## 2025-03-10 PROBLEM — R35.1 NOCTURIA: Status: ACTIVE | Noted: 2022-11-22

## 2025-03-10 PROBLEM — M75.40 IMPINGEMENT SYNDROME OF SHOULDER REGION: Status: ACTIVE | Noted: 2019-07-12

## 2025-03-10 PROBLEM — R73.9 HYPERGLYCEMIA: Status: ACTIVE | Noted: 2025-03-10

## 2025-03-10 PROBLEM — R39.12 POOR URINARY STREAM: Status: ACTIVE | Noted: 2022-11-22

## 2025-03-10 PROBLEM — N39.41 URGE INCONTINENCE OF URINE: Status: ACTIVE | Noted: 2022-11-22

## 2025-03-10 PROBLEM — R35.0 FREQUENCY OF MICTURITION: Status: ACTIVE | Noted: 2022-11-22

## 2025-03-10 PROBLEM — M54.50 LOW BACK PAIN, UNSPECIFIED: Status: ACTIVE | Noted: 2024-08-19

## 2025-03-10 PROCEDURE — 99213 OFFICE O/P EST LOW 20 MIN: CPT | Performed by: NURSE PRACTITIONER

## 2025-03-10 PROCEDURE — 3008F BODY MASS INDEX DOCD: CPT | Performed by: NURSE PRACTITIONER

## 2025-03-10 PROCEDURE — 1036F TOBACCO NON-USER: CPT | Performed by: NURSE PRACTITIONER

## 2025-03-10 PROCEDURE — 3079F DIAST BP 80-89 MM HG: CPT | Performed by: NURSE PRACTITIONER

## 2025-03-10 PROCEDURE — 3075F SYST BP GE 130 - 139MM HG: CPT | Performed by: NURSE PRACTITIONER

## 2025-03-10 RX ORDER — IBUPROFEN 200 MG
200 TABLET ORAL EVERY 6 HOURS PRN
COMMUNITY
Start: 2025-03-04 | End: 2025-03-11

## 2025-03-10 RX ORDER — SEMAGLUTIDE 1.34 MG/ML
1 INJECTION, SOLUTION SUBCUTANEOUS
COMMUNITY
Start: 2025-02-05

## 2025-03-10 RX ORDER — OMEPRAZOLE 20 MG/1
20 CAPSULE, DELAYED RELEASE ORAL DAILY
Qty: 90 CAPSULE | Refills: 0 | Status: SHIPPED | OUTPATIENT
Start: 2025-03-10

## 2025-03-10 RX ORDER — GUAIFENESIN 600 MG/1
600-1200 TABLET, EXTENDED RELEASE ORAL 2 TIMES DAILY PRN
COMMUNITY
Start: 2025-03-04 | End: 2025-03-11

## 2025-03-10 RX ORDER — PRAVASTATIN SODIUM 20 MG/1
20 TABLET ORAL
COMMUNITY
Start: 2024-12-16

## 2025-03-10 ASSESSMENT — ENCOUNTER SYMPTOMS
BACK PAIN: 1
FEVER: 0
ABDOMINAL PAIN: 1
SINUS PRESSURE: 1
HEADACHES: 1
FATIGUE: 1
NAUSEA: 0
DIZZINESS: 1
SINUS PAIN: 1
ARTHRALGIAS: 1
CARDIOVASCULAR NEGATIVE: 1
DIARRHEA: 1
RHINORRHEA: 0
CHILLS: 1
BLOOD IN STOOL: 0
COUGH: 0

## 2025-03-10 NOTE — PROGRESS NOTES
"Subjective   Patient ID: Erica Dubose is a 44 y.o. male who presents for Diarrhea (Rb pt here for acute visit with issues with his stomach. Started 10 days ago. Started with flu like sx, fever. He drove to [place] last Sunday to  his parents from [facility]. He had a friend look in his throat and informed him he should take an antibiotic. He had some left over amoxicillin at home. He's taken just about everything otc. /He's been having diarrhea, very dark in color. Stomach pain. He's had some tenderness with touching his stomach. He couldn't sleep. /Lov 11/12/24/Nov 5/28/25).    HPI   Patient of Dr Her here for acute concern. Last office visit on 11/12/2024  Current concern:  1) Diarrhea ongoing, Overall ill feeling started 12 days ago with \"flu-like symptoms\".  Chills and intermittent fever, dizziness, headache, back pain,  On advice from friend he started \"left-over amoxicillin he had at home\". Took 3 days of antibiotic. 1000 mg every 8 hours and this helped with sinus and congested. Has also been taking many otc medications to help with symptoms, Had used Pepto-bismol which did help slightly. He is on Ozempic, which was recently titrated to next higher dose, initial week no problem, but this past Saturday (03/08/2025) his GI symptoms were improving until after injection of Ozempic and restarted with 4 episodes of cramping diarrhea and feeling chilled again. He did message endocrinologist regarding symptoms, reported back that not likely associated and to continue on current dose. Patient also had discontinued vaping for the past week, Blood sugar 112 this morning (fasting) no appetite, no nausea or vomiting.   Chronic concerns: T2DM, elevated ALT, Fatty liver, IBS, GERD.     Review of Systems   Constitutional:  Positive for chills (as noted in HPI) and fatigue. Negative for fever.   HENT:  Positive for congestion, sinus pressure and sinus pain. Negative for ear pain and rhinorrhea.    Respiratory:  Negative " "for cough.    Cardiovascular: Negative.    Gastrointestinal:  Positive for abdominal pain (as noted in HPI) and diarrhea. Negative for blood in stool and nausea.        Forcing himself to eat.   No problems drinking fluid   Genitourinary: Negative.    Musculoskeletal:  Positive for arthralgias and back pain (initally back pain).   Skin: Negative.    Neurological:  Positive for dizziness and headaches.     Objective   /82 (BP Location: Right arm, Patient Position: Sitting, BP Cuff Size: Large adult)   Pulse 72   Ht 1.753 m (5' 9\")   Wt 103 kg (228 lb)   SpO2 98%   BMI 33.67 kg/m²   Weight at last appt in November 230 lbs   Physical Exam  Vitals reviewed.   Constitutional:       General: He is not in acute distress.     Appearance: He is not ill-appearing.   HENT:      Nose:      Right Sinus: Maxillary sinus tenderness (slight) and frontal sinus tenderness (slight) present.      Left Sinus: Maxillary sinus tenderness (slight) and frontal sinus tenderness (slight) present.      Mouth/Throat:      Lips: Pink.      Mouth: Mucous membranes are moist.      Pharynx: Posterior oropharyngeal erythema present. No pharyngeal swelling.   Eyes:      General: Lids are normal.      Conjunctiva/sclera: Conjunctivae normal.   Cardiovascular:      Rate and Rhythm: Normal rate and regular rhythm.      Heart sounds: Normal heart sounds.   Pulmonary:      Effort: Pulmonary effort is normal.      Breath sounds: Normal breath sounds.   Abdominal:      General: Bowel sounds are normal.      Tenderness: There is no abdominal tenderness.   Musculoskeletal:      Cervical back: Neck supple.   Lymphadenopathy:      Cervical: No cervical adenopathy.   Skin:     General: Skin is warm.      Findings: No rash.   Neurological:      General: No focal deficit present.      Mental Status: He is alert.   Psychiatric:         Attention and Perception: Attention normal.         Behavior: Behavior normal. Behavior is cooperative. "       Assessment/Plan   Diagnoses and all orders for this visit:  Diarrhea, unspecified type / Congestion of paranasal sinus  Continue to use Mucinex while congestion continues.  Only use Afrin short period of time.  May want to use antihistamine nasal spray daily.   Recommend bland diet, adequate hydration, continue probiotic.   Contact endocrinologist regarding keeping Ozempic at lower dose at least until diarrhea improves  Gastroesophageal reflux disease, unspecified whether esophagitis present  - refilled omeprazole (PriLOSEC) 20 mg DR capsule; Take 1 capsule (20 mg) by mouth once daily. Do not crush or chew.      Continue on Omeprazole while feeling sick with diarrhea, then discontinue and see if reflux returns. If so start using again daily.       PLAN: Follow up with Dr Her as scheduled

## 2025-03-10 NOTE — PATIENT INSTRUCTIONS
Continue on Omeprazole while feeling sick with diarrhea, then discontinue and see if reflux returns. If so start using again daily.    Continue to use Mucinex while congestion continues    Contact endocrinologist regarding keeping Ozempic at lower dose at least until diarrhea improves.     Only use Afrin short period of time.

## 2025-05-13 ENCOUNTER — APPOINTMENT (OUTPATIENT)
Dept: PRIMARY CARE | Facility: CLINIC | Age: 45
End: 2025-05-13
Payer: COMMERCIAL

## 2025-05-28 ENCOUNTER — APPOINTMENT (OUTPATIENT)
Dept: PRIMARY CARE | Facility: CLINIC | Age: 45
End: 2025-05-28
Payer: COMMERCIAL

## 2025-05-28 VITALS
HEART RATE: 63 BPM | DIASTOLIC BLOOD PRESSURE: 74 MMHG | WEIGHT: 230 LBS | SYSTOLIC BLOOD PRESSURE: 128 MMHG | HEIGHT: 69 IN | OXYGEN SATURATION: 96 % | BODY MASS INDEX: 34.07 KG/M2

## 2025-05-28 DIAGNOSIS — K76.0 FATTY LIVER: ICD-10-CM

## 2025-05-28 DIAGNOSIS — F43.0 STRESS REACTION: Primary | ICD-10-CM

## 2025-05-28 DIAGNOSIS — K21.9 GASTROESOPHAGEAL REFLUX DISEASE, UNSPECIFIED WHETHER ESOPHAGITIS PRESENT: ICD-10-CM

## 2025-05-28 DIAGNOSIS — E78.00 ELEVATED LDL CHOLESTEROL LEVEL: ICD-10-CM

## 2025-05-28 DIAGNOSIS — E11.9 CONTROLLED TYPE 2 DIABETES MELLITUS WITHOUT COMPLICATION, WITHOUT LONG-TERM CURRENT USE OF INSULIN: ICD-10-CM

## 2025-05-28 PROBLEM — R20.2 NUMBNESS AND TINGLING IN RIGHT HAND: Status: RESOLVED | Noted: 2023-05-31 | Resolved: 2025-05-28

## 2025-05-28 PROBLEM — M25.521 RIGHT ELBOW PAIN: Status: RESOLVED | Noted: 2023-07-17 | Resolved: 2025-05-28

## 2025-05-28 PROBLEM — R20.0 NUMBNESS AND TINGLING IN RIGHT HAND: Status: RESOLVED | Noted: 2023-05-31 | Resolved: 2025-05-28

## 2025-05-28 PROBLEM — M75.40 IMPINGEMENT SYNDROME OF SHOULDER REGION: Status: RESOLVED | Noted: 2019-07-12 | Resolved: 2025-05-28

## 2025-05-28 PROBLEM — M79.644 PAIN OF RIGHT THUMB: Status: RESOLVED | Noted: 2023-05-31 | Resolved: 2025-05-28

## 2025-05-28 PROBLEM — M54.50 LOW BACK PAIN, UNSPECIFIED: Status: RESOLVED | Noted: 2024-08-19 | Resolved: 2025-05-28

## 2025-05-28 PROCEDURE — 3074F SYST BP LT 130 MM HG: CPT | Performed by: INTERNAL MEDICINE

## 2025-05-28 PROCEDURE — 1036F TOBACCO NON-USER: CPT | Performed by: INTERNAL MEDICINE

## 2025-05-28 PROCEDURE — 99214 OFFICE O/P EST MOD 30 MIN: CPT | Performed by: INTERNAL MEDICINE

## 2025-05-28 PROCEDURE — 3078F DIAST BP <80 MM HG: CPT | Performed by: INTERNAL MEDICINE

## 2025-05-28 PROCEDURE — 3008F BODY MASS INDEX DOCD: CPT | Performed by: INTERNAL MEDICINE

## 2025-05-28 RX ORDER — LANCETS 33 GAUGE
EACH MISCELLANEOUS
COMMUNITY
Start: 2024-12-13

## 2025-05-28 RX ORDER — REPAGLINIDE 0.5 MG/1
1 TABLET ORAL DAILY PRN
COMMUNITY
Start: 2025-04-07

## 2025-05-28 RX ORDER — LANCETS 30 GAUGE
EACH MISCELLANEOUS
COMMUNITY
Start: 2024-12-13

## 2025-05-28 RX ORDER — FAMOTIDINE 20 MG/1
1 TABLET, FILM COATED ORAL
COMMUNITY
Start: 2025-05-01

## 2025-05-28 RX ORDER — SEMAGLUTIDE 0.68 MG/ML
INJECTION, SOLUTION SUBCUTANEOUS
COMMUNITY

## 2025-05-28 RX ORDER — BLOOD-GLUCOSE METER
EACH MISCELLANEOUS
COMMUNITY

## 2025-05-28 ASSESSMENT — ENCOUNTER SYMPTOMS
ABDOMINAL PAIN: 0
SHORTNESS OF BREATH: 0
FATIGUE: 0
DIZZINESS: 0
MYALGIAS: 0

## 2025-05-28 NOTE — PROGRESS NOTES
"Subjective   Patient ID: Erica Dubose is a 44 y.o. male who presents for Follow-up chronic medical problems.    Endo following DM.  Allergy problems this time of year.  Has claritin, takes as needed.  Meds and labs reviewed.    Hyperlipidemia  This is a chronic problem. The current episode started more than 1 year ago. The problem is controlled. Recent lipid tests were reviewed and are low. Exacerbating diseases include diabetes and obesity. Pertinent negatives include no chest pain, myalgias or shortness of breath. Current antihyperlipidemic treatment includes statins. The current treatment provides significant improvement of lipids. There are no compliance problems.    GERD  He reports no abdominal pain, no chest pain or no dysphagia. This is a chronic problem. The current episode started more than 1 year ago. The problem occurs occasionally. The problem has been waxing and waning. Pertinent negatives include no fatigue.        Review of Systems   Constitutional:  Negative for fatigue.   Respiratory:  Negative for shortness of breath.    Cardiovascular:  Negative for chest pain.   Gastrointestinal:  Negative for abdominal pain and dysphagia.   Musculoskeletal:  Negative for myalgias.   Neurological:  Negative for dizziness.       Objective   /74 (BP Location: Right arm, Patient Position: Sitting)   Pulse 63   Ht 1.753 m (5' 9\")   Wt 104 kg (230 lb)   SpO2 96%   BMI 33.97 kg/m²     Physical Exam  Constitutional:       Appearance: Normal appearance. He is obese.   Neck:      Vascular: No carotid bruit.   Cardiovascular:      Rate and Rhythm: Normal rate and regular rhythm.      Pulses: Normal pulses.      Heart sounds: Normal heart sounds.   Pulmonary:      Effort: Pulmonary effort is normal.      Breath sounds: Normal breath sounds.   Neurological:      General: No focal deficit present.      Mental Status: He is alert.   Psychiatric:         Mood and Affect: Mood normal.         Behavior: " Behavior normal.         Thought Content: Thought content normal.         Judgment: Judgment normal.         Assessment/Plan   Problem List Items Addressed This Visit           ICD-10-CM    Elevated LDL cholesterol level E78.00    Lipids, LDL at goal with statin.  Recommend low fat/cholesterol diet.           Fatty liver K76.0    Liver enzymes normal, labs done 3-2025.  Followed with GI.         GERD (gastroesophageal reflux disease) K21.9    GERD improved with H2 blocker as needed.           Stress reaction - Primary F43.0    Mood much improved with sertraline.         Controlled type 2 diabetes mellitus without complication, without long-term current use of insulin E11.9    A1c normal with ozempic, prandin as needed for BS > 200.  Followed with endocrine.

## 2025-06-09 DIAGNOSIS — F43.0 ACUTE STRESS REACTION: ICD-10-CM

## 2025-06-09 RX ORDER — SERTRALINE HYDROCHLORIDE 50 MG/1
50 TABLET, FILM COATED ORAL DAILY
Qty: 90 TABLET | Refills: 1 | Status: SHIPPED | OUTPATIENT
Start: 2025-06-09

## 2025-12-02 ENCOUNTER — APPOINTMENT (OUTPATIENT)
Dept: PRIMARY CARE | Facility: CLINIC | Age: 45
End: 2025-12-02
Payer: COMMERCIAL